# Patient Record
Sex: FEMALE | Race: BLACK OR AFRICAN AMERICAN | NOT HISPANIC OR LATINO
[De-identification: names, ages, dates, MRNs, and addresses within clinical notes are randomized per-mention and may not be internally consistent; named-entity substitution may affect disease eponyms.]

---

## 2017-10-26 ENCOUNTER — APPOINTMENT (OUTPATIENT)
Dept: INTERNAL MEDICINE | Facility: CLINIC | Age: 27
End: 2017-10-26
Payer: COMMERCIAL

## 2017-10-26 VITALS — SYSTOLIC BLOOD PRESSURE: 100 MMHG | DIASTOLIC BLOOD PRESSURE: 70 MMHG

## 2017-10-26 VITALS — WEIGHT: 188 LBS | BODY MASS INDEX: 36.91 KG/M2 | HEIGHT: 60 IN

## 2017-10-26 DIAGNOSIS — Z00.00 ENCOUNTER FOR GENERAL ADULT MEDICAL EXAMINATION W/OUT ABNORMAL FINDINGS: ICD-10-CM

## 2017-10-26 DIAGNOSIS — E55.9 VITAMIN D DEFICIENCY, UNSPECIFIED: ICD-10-CM

## 2017-10-26 DIAGNOSIS — R73.09 OTHER ABNORMAL GLUCOSE: ICD-10-CM

## 2017-10-26 DIAGNOSIS — E66.9 OBESITY, UNSPECIFIED: ICD-10-CM

## 2017-10-26 DIAGNOSIS — Z23 ENCOUNTER FOR IMMUNIZATION: ICD-10-CM

## 2017-10-26 DIAGNOSIS — Z92.29 PERSONAL HISTORY OF OTHER DRUG THERAPY: ICD-10-CM

## 2017-10-26 PROCEDURE — 99395 PREV VISIT EST AGE 18-39: CPT | Mod: 25

## 2017-10-26 PROCEDURE — 36415 COLL VENOUS BLD VENIPUNCTURE: CPT

## 2017-11-03 LAB
25(OH)D3 SERPL-MCNC: 27.4 NG/ML
ALBUMIN SERPL ELPH-MCNC: 3.8 G/DL
ALP BLD-CCNC: 54 U/L
ALT SERPL-CCNC: 12 U/L
ANION GAP SERPL CALC-SCNC: 13 MMOL/L
AST SERPL-CCNC: 17 U/L
BASOPHILS # BLD AUTO: 0.02 K/UL
BASOPHILS NFR BLD AUTO: 0.3 %
BILIRUB SERPL-MCNC: 0.3 MG/DL
BUN SERPL-MCNC: 16 MG/DL
CALCIUM SERPL-MCNC: 9.3 MG/DL
CHLORIDE SERPL-SCNC: 104 MMOL/L
CHOLEST SERPL-MCNC: 205 MG/DL
CHOLEST/HDLC SERPL: 3.1 RATIO
CO2 SERPL-SCNC: 23 MMOL/L
CREAT SERPL-MCNC: 0.97 MG/DL
EOSINOPHIL # BLD AUTO: 0.16 K/UL
EOSINOPHIL NFR BLD AUTO: 2.3 %
GLUCOSE SERPL-MCNC: 90 MG/DL
HBA1C MFR BLD HPLC: 5.6 %
HCT VFR BLD CALC: 40.3 %
HDLC SERPL-MCNC: 67 MG/DL
HGB BLD-MCNC: 12.6 G/DL
IMM GRANULOCYTES NFR BLD AUTO: 0 %
LDLC SERPL CALC-MCNC: 123 MG/DL
LYMPHOCYTES # BLD AUTO: 2.87 K/UL
LYMPHOCYTES NFR BLD AUTO: 41.4 %
MAN DIFF?: NORMAL
MCHC RBC-ENTMCNC: 29.4 PG
MCHC RBC-ENTMCNC: 31.3 GM/DL
MCV RBC AUTO: 94.2 FL
MONOCYTES # BLD AUTO: 0.39 K/UL
MONOCYTES NFR BLD AUTO: 5.6 %
NEUTROPHILS # BLD AUTO: 3.49 K/UL
NEUTROPHILS NFR BLD AUTO: 50.4 %
PLATELET # BLD AUTO: 323 K/UL
POTASSIUM SERPL-SCNC: 4.4 MMOL/L
PROT SERPL-MCNC: 7.4 G/DL
RBC # BLD: 4.28 M/UL
RBC # FLD: 14 %
SODIUM SERPL-SCNC: 140 MMOL/L
TRIGL SERPL-MCNC: 77 MG/DL
TSH SERPL-ACNC: 2.51 UIU/ML
WBC # FLD AUTO: 6.93 K/UL

## 2019-04-09 ENCOUNTER — EMERGENCY (EMERGENCY)
Facility: HOSPITAL | Age: 29
LOS: 1 days | Discharge: HOME | End: 2019-04-09
Attending: EMERGENCY MEDICINE
Payer: COMMERCIAL

## 2019-04-09 VITALS
OXYGEN SATURATION: 98 % | WEIGHT: 199.96 LBS | HEIGHT: 60 IN | HEART RATE: 120 BPM | TEMPERATURE: 100 F | DIASTOLIC BLOOD PRESSURE: 72 MMHG | SYSTOLIC BLOOD PRESSURE: 102 MMHG | RESPIRATION RATE: 20 BRPM

## 2019-04-09 PROCEDURE — 99283 EMERGENCY DEPT VISIT LOW MDM: CPT | Mod: 25

## 2019-04-10 VITALS
RESPIRATION RATE: 18 BRPM | OXYGEN SATURATION: 98 % | TEMPERATURE: 99 F | DIASTOLIC BLOOD PRESSURE: 78 MMHG | HEART RATE: 93 BPM | SYSTOLIC BLOOD PRESSURE: 119 MMHG

## 2019-04-10 PROCEDURE — 94640 AIRWAY INHALATION TREATMENT: CPT

## 2019-04-10 PROCEDURE — 99283 EMERGENCY DEPT VISIT LOW MDM: CPT

## 2019-04-10 RX ORDER — IBUPROFEN 200 MG
600 TABLET ORAL ONCE
Qty: 0 | Refills: 0 | Status: COMPLETED | OUTPATIENT
Start: 2019-04-10 | End: 2019-04-10

## 2019-04-10 RX ORDER — ALBUTEROL 90 UG/1
2 AEROSOL, METERED ORAL EVERY 6 HOURS
Qty: 0 | Refills: 0 | Status: DISCONTINUED | OUTPATIENT
Start: 2019-04-10 | End: 2019-04-14

## 2019-04-10 RX ADMIN — Medication 600 MILLIGRAM(S): at 00:34

## 2019-04-10 RX ADMIN — ALBUTEROL 2 PUFF(S): 90 AEROSOL, METERED ORAL at 00:28

## 2019-04-10 NOTE — ED PROVIDER NOTE - OBJECTIVE STATEMENT
27 y/o F pt with no PMHx presents to the ED c/o sudden onset fever beginning this morning.  Pt had a fever 100.8F (oral) this morning, and notes associated body aches, mild cough, nasal congestion, chills, and fatigue.  Pt has taken Tylenol and Mucinex for her symptoms with little to no relief.  Denies CP, SOB, N/V, dysuria, hematuria.

## 2019-04-10 NOTE — ED ADULT NURSE NOTE - OBJECTIVE STATEMENT
Pt with sudden onset fever and body aches yesterday with associated cough, chills, nausea. Pt denies any other ailments and as per FAUZIA Chris, pt to be dc'd after meds.

## 2019-04-10 NOTE — ED PROVIDER NOTE - ATTENDING CONTRIBUTION TO CARE
I, Chirag Medina, performed the initial face to face bedside interview with this patient regarding history of present illness, review of symptoms and relevant past medical, social and family history.  I completed an independent physical examination.  I was the initial provider who evaluated this patient.  The history, relevant review of systems, past medical and surgical history, medical decision making, and physical examination was documented by the scribe in my presence and I attest to the accuracy of the documentation.  I have signed out the follow up of any pending tests (i.e. labs, radiological studies) to the ACP.  I have communicated the patient’s plan of care and disposition with the ACP.

## 2019-04-10 NOTE — ED PROVIDER NOTE - NS ED ROS FT
ROS: CONTUSIONAL: Denies wt loss. HEAD: Denies trauma, HA, Dizziness. EYE: Denies Acute visual changes, diplopia. ENMT: Denies change in hearing, tinnitus, epistaxis, difficulty swallowing, sore throat. CARDIO: Denies CP, palpitations, edema. RESP: Denies SOB , Diff breathing, hemoptysis. GI: Denies N/V, ABD pain, change in bowel movement. URINARY: Denies difficulty urinating, pelvic pain. MS:  Denies back pain, weakness, decreased ROM, swelling. NEURO: Denies change in gait, seizures, loss of sensation, dizziness, confusion LOC.  PSY: NO SI/HI.

## 2019-04-10 NOTE — ED PROVIDER NOTE - CLINICAL SUMMARY MEDICAL DECISION MAKING FREE TEXT BOX
Pt with stable VS, tolerating PO in the ed making urine non toxic appearing interacting with staff and family appropriately, PT will be dc home with follow up to PCP, good supportive care, educated mom about proper use of antipyretics, good hydrations, educated about when to return to the ED if needed. PT verbalizes that he understands all instructions and results. Pt educated about the risks vs benefits of imaging at this time and agrees that not warranted for their symptoms, and PE

## 2019-04-10 NOTE — ED ADULT NURSE NOTE - NSIMPLEMENTINTERV_GEN_ALL_ED
Implemented All Universal Safety Interventions:  [place] to call system. Call bell, personal items and telephone within reach. Instruct patient to call for assistance. Room bathroom lighting operational. Non-slip footwear when patient is off stretcher. Physically safe environment: no spills, clutter or unnecessary equipment. Stretcher in lowest position, wheels locked, appropriate side rails in place.

## 2019-04-10 NOTE — ED PROVIDER NOTE - CONSTITUTIONAL, MLM
normal... Awake, non-toxic, and in no apparent distress. Awake, non-toxic, and in no apparent distress. diaphoretic

## 2019-12-09 ENCOUNTER — EMERGENCY (EMERGENCY)
Facility: HOSPITAL | Age: 29
LOS: 1 days | Discharge: HOME | End: 2019-12-09
Attending: EMERGENCY MEDICINE
Payer: COMMERCIAL

## 2019-12-09 VITALS
DIASTOLIC BLOOD PRESSURE: 69 MMHG | HEART RATE: 88 BPM | WEIGHT: 190.04 LBS | TEMPERATURE: 98 F | RESPIRATION RATE: 18 BRPM | HEIGHT: 60 IN | SYSTOLIC BLOOD PRESSURE: 114 MMHG | OXYGEN SATURATION: 99 %

## 2019-12-09 PROBLEM — Z78.9 OTHER SPECIFIED HEALTH STATUS: Chronic | Status: ACTIVE | Noted: 2019-04-10

## 2019-12-09 LAB
ALBUMIN SERPL ELPH-MCNC: 3.8 G/DL — SIGNIFICANT CHANGE UP (ref 3.3–5.2)
ALP SERPL-CCNC: 59 U/L — SIGNIFICANT CHANGE UP (ref 40–120)
ALT FLD-CCNC: 12 U/L — SIGNIFICANT CHANGE UP
ANION GAP SERPL CALC-SCNC: 11 MMOL/L — SIGNIFICANT CHANGE UP (ref 5–17)
APPEARANCE UR: CLEAR — SIGNIFICANT CHANGE UP
AST SERPL-CCNC: 18 U/L — SIGNIFICANT CHANGE UP
BACTERIA # UR AUTO: NEGATIVE — SIGNIFICANT CHANGE UP
BASOPHILS # BLD AUTO: 0.04 K/UL — SIGNIFICANT CHANGE UP (ref 0–0.2)
BASOPHILS NFR BLD AUTO: 0.7 % — SIGNIFICANT CHANGE UP (ref 0–2)
BILIRUB SERPL-MCNC: 0.4 MG/DL — SIGNIFICANT CHANGE UP (ref 0.4–2)
BILIRUB UR-MCNC: NEGATIVE — SIGNIFICANT CHANGE UP
BUN SERPL-MCNC: 10 MG/DL — SIGNIFICANT CHANGE UP (ref 8–20)
CALCIUM SERPL-MCNC: 8.7 MG/DL — SIGNIFICANT CHANGE UP (ref 8.6–10.2)
CHLORIDE SERPL-SCNC: 101 MMOL/L — SIGNIFICANT CHANGE UP (ref 98–107)
CO2 SERPL-SCNC: 24 MMOL/L — SIGNIFICANT CHANGE UP (ref 22–29)
COLOR SPEC: YELLOW — SIGNIFICANT CHANGE UP
CREAT SERPL-MCNC: 0.71 MG/DL — SIGNIFICANT CHANGE UP (ref 0.5–1.3)
DIFF PNL FLD: ABNORMAL
EOSINOPHIL # BLD AUTO: 0.12 K/UL — SIGNIFICANT CHANGE UP (ref 0–0.5)
EOSINOPHIL NFR BLD AUTO: 2 % — SIGNIFICANT CHANGE UP (ref 0–6)
EPI CELLS # UR: SIGNIFICANT CHANGE UP
GLUCOSE SERPL-MCNC: 90 MG/DL — SIGNIFICANT CHANGE UP (ref 70–115)
GLUCOSE UR QL: NEGATIVE MG/DL — SIGNIFICANT CHANGE UP
HCG SERPL-ACNC: <4 MIU/ML — SIGNIFICANT CHANGE UP
HCG UR QL: NEGATIVE — SIGNIFICANT CHANGE UP
HCT VFR BLD CALC: 38.9 % — SIGNIFICANT CHANGE UP (ref 34.5–45)
HGB BLD-MCNC: 12.7 G/DL — SIGNIFICANT CHANGE UP (ref 11.5–15.5)
IMM GRANULOCYTES NFR BLD AUTO: 0.2 % — SIGNIFICANT CHANGE UP (ref 0–1.5)
KETONES UR-MCNC: NEGATIVE — SIGNIFICANT CHANGE UP
LACTATE BLDV-MCNC: 0.5 MMOL/L — SIGNIFICANT CHANGE UP (ref 0.5–2)
LEUKOCYTE ESTERASE UR-ACNC: ABNORMAL
LIDOCAIN IGE QN: 19 U/L — LOW (ref 22–51)
LYMPHOCYTES # BLD AUTO: 1.83 K/UL — SIGNIFICANT CHANGE UP (ref 1–3.3)
LYMPHOCYTES # BLD AUTO: 30.1 % — SIGNIFICANT CHANGE UP (ref 13–44)
MCHC RBC-ENTMCNC: 30.1 PG — SIGNIFICANT CHANGE UP (ref 27–34)
MCHC RBC-ENTMCNC: 32.6 GM/DL — SIGNIFICANT CHANGE UP (ref 32–36)
MCV RBC AUTO: 92.2 FL — SIGNIFICANT CHANGE UP (ref 80–100)
MONOCYTES # BLD AUTO: 0.5 K/UL — SIGNIFICANT CHANGE UP (ref 0–0.9)
MONOCYTES NFR BLD AUTO: 8.2 % — SIGNIFICANT CHANGE UP (ref 2–14)
NEUTROPHILS # BLD AUTO: 3.57 K/UL — SIGNIFICANT CHANGE UP (ref 1.8–7.4)
NEUTROPHILS NFR BLD AUTO: 58.8 % — SIGNIFICANT CHANGE UP (ref 43–77)
NITRITE UR-MCNC: NEGATIVE — SIGNIFICANT CHANGE UP
OB PNL STL: NEGATIVE — SIGNIFICANT CHANGE UP
PH UR: 7 — SIGNIFICANT CHANGE UP (ref 5–8)
PLATELET # BLD AUTO: 282 K/UL — SIGNIFICANT CHANGE UP (ref 150–400)
POTASSIUM SERPL-MCNC: 4.3 MMOL/L — SIGNIFICANT CHANGE UP (ref 3.5–5.3)
POTASSIUM SERPL-SCNC: 4.3 MMOL/L — SIGNIFICANT CHANGE UP (ref 3.5–5.3)
PROT SERPL-MCNC: 7.5 G/DL — SIGNIFICANT CHANGE UP (ref 6.6–8.7)
PROT UR-MCNC: 15 MG/DL
RBC # BLD: 4.22 M/UL — SIGNIFICANT CHANGE UP (ref 3.8–5.2)
RBC # FLD: 12.4 % — SIGNIFICANT CHANGE UP (ref 10.3–14.5)
RBC CASTS # UR COMP ASSIST: SIGNIFICANT CHANGE UP /HPF (ref 0–4)
SODIUM SERPL-SCNC: 136 MMOL/L — SIGNIFICANT CHANGE UP (ref 135–145)
SP GR SPEC: 1.01 — SIGNIFICANT CHANGE UP (ref 1.01–1.02)
UROBILINOGEN FLD QL: NEGATIVE MG/DL — SIGNIFICANT CHANGE UP
WBC # BLD: 6.07 K/UL — SIGNIFICANT CHANGE UP (ref 3.8–10.5)
WBC # FLD AUTO: 6.07 K/UL — SIGNIFICANT CHANGE UP (ref 3.8–10.5)
WBC UR QL: SIGNIFICANT CHANGE UP

## 2019-12-09 PROCEDURE — 99284 EMERGENCY DEPT VISIT MOD MDM: CPT

## 2019-12-09 PROCEDURE — 84702 CHORIONIC GONADOTROPIN TEST: CPT

## 2019-12-09 PROCEDURE — 83690 ASSAY OF LIPASE: CPT

## 2019-12-09 PROCEDURE — 36415 COLL VENOUS BLD VENIPUNCTURE: CPT

## 2019-12-09 PROCEDURE — 74177 CT ABD & PELVIS W/CONTRAST: CPT | Mod: 26

## 2019-12-09 PROCEDURE — 74177 CT ABD & PELVIS W/CONTRAST: CPT

## 2019-12-09 PROCEDURE — 81001 URINALYSIS AUTO W/SCOPE: CPT

## 2019-12-09 PROCEDURE — 80053 COMPREHEN METABOLIC PANEL: CPT

## 2019-12-09 PROCEDURE — 85027 COMPLETE CBC AUTOMATED: CPT

## 2019-12-09 PROCEDURE — 87086 URINE CULTURE/COLONY COUNT: CPT

## 2019-12-09 PROCEDURE — 82272 OCCULT BLD FECES 1-3 TESTS: CPT

## 2019-12-09 PROCEDURE — 81025 URINE PREGNANCY TEST: CPT

## 2019-12-09 PROCEDURE — 83605 ASSAY OF LACTIC ACID: CPT

## 2019-12-09 RX ORDER — SODIUM CHLORIDE 9 MG/ML
1000 INJECTION INTRAMUSCULAR; INTRAVENOUS; SUBCUTANEOUS ONCE
Refills: 0 | Status: COMPLETED | OUTPATIENT
Start: 2019-12-09 | End: 2019-12-09

## 2019-12-09 RX ADMIN — Medication 1 TABLET(S): at 15:28

## 2019-12-09 RX ADMIN — SODIUM CHLORIDE 1000 MILLILITER(S): 9 INJECTION INTRAMUSCULAR; INTRAVENOUS; SUBCUTANEOUS at 10:40

## 2019-12-09 NOTE — ED STATDOCS - CARE PROVIDER_API CALL
Kendrick Copeland)  Gastroenterology; Internal Medicine  [address] [place] [address]  Phone: [phone]  Fax: [phone]  Follow Up Time: 7-10 Days

## 2019-12-09 NOTE — ED STATDOCS - ATTENDING CONTRIBUTION TO CARE
I, Elisabeth Valencia, performed a face to face bedside interview with this patient regarding history of present illness, review of symptoms and relevant past medical, social and family history.  I completed an independent physical examination. Medical decision making, follow-up on ordered tests (ie labs, radiologic studies) and re-evaluation of the patient's status has been communicated to the ACP.  Disposition of the patient will be based on test outcome and response to ED interventions.

## 2019-12-09 NOTE — ED STATDOCS - CARE PROVIDERS DIRECT ADDRESSES
[email or web address], [email or web address]

## 2019-12-09 NOTE — ED STATDOCS - PATIENT PORTAL LINK FT
You can access the FollowMyHealth Patient Portal offered by [facility] by registering at the following website: [email or web address]. By joining [employer]’s FollowMyHealth portal, you will also be able to view your health information using other applications (apps) compatible with our system.

## 2019-12-09 NOTE — ED STATDOCS - GENITOURINARY [-], MLM
no hematuria/no difficulty urinating/no dysuria/no pelvic pain/no STD exposure/no dysmenorrhea no hematuria/no dysuria/no difficulty urinating/no pelvic pain/no dysmenorrhea

## 2019-12-09 NOTE — ED STATDOCS - NSFOLLOWUPINSTRUCTIONS_ED_ALL_ED_FT
Please follow up with your Primary doctor in 24-48 hr.  Seek immediate medical care for any new and/or worsening signs or symptoms.   Take medications as directed, be sure to complete entire course of antibiotics as directed   Follow up with Gastroenterologist (GI) doctor in 1 week

## 2019-12-09 NOTE — ED STATDOCS - PROGRESS NOTE DETAILS
FAUZIA Lucia NOTE: Pt evaluated at bedside. No hx of GI problems. PE: non toxic, abd soft, +mildly tender to palpation over LUQ and LLQ, no guarding or rigidity, no CVA tenderness. Pt evaluated prior by intake physician. Otherwise HPI/PE/ROS as noted above. Will follow up plan per intake physician. FAUZIA Lucia NOTE: Reviewed all results with Dr. Valencia, will Rx Augmentin, f/u GI doctor. Pt stable for d/c, reports improvement, VSS, tolerating PO, ambulatory.  Discussion includes results, plan, proper medication use/side effects, and return precautions. Pt advised to f/u with PMD 1-2 days and specialists discussed.  Pt given printed copies of lab/radiology for outpt follow up. Pt verbalized understanding/agreement of plan.

## 2019-12-09 NOTE — ED STATDOCS - OBJECTIVE STATEMENT
28 y/o F pt with no significant past medical history presents to the ED c/o abdominal pain that onset 1 week ago. Patient states that every time she eats she feels gassy and bloated. She reports being able to pass gas but states it recently has been worsening. Patient states her abdominal pain has been intermittent for about month but lately has been constant. Patient noticed a sudden onset of blood in her stool yesterday and states that it seemed as though she had her menstrual period. Reports nausea but denies vomiting and diarrhea

## 2019-12-09 NOTE — ED STATDOCS - CLINICAL SUMMARY MEDICAL DECISION MAKING FREE TEXT BOX
28 y/o F with bloating, abdominal pain and bloody stools. Concern for colitis. Will check labs, CT abdomen/pelvis, give fluids,. Tylenol and reassess.

## 2019-12-10 LAB
CULTURE RESULTS: NO GROWTH — SIGNIFICANT CHANGE UP
SPECIMEN SOURCE: SIGNIFICANT CHANGE UP

## 2020-04-14 ENCOUNTER — APPOINTMENT (OUTPATIENT)
Dept: OBGYN | Facility: CLINIC | Age: 30
End: 2020-04-14
Payer: COMMERCIAL

## 2020-04-14 VITALS
HEIGHT: 60 IN | BODY MASS INDEX: 37.5 KG/M2 | DIASTOLIC BLOOD PRESSURE: 70 MMHG | WEIGHT: 191 LBS | SYSTOLIC BLOOD PRESSURE: 110 MMHG

## 2020-04-14 DIAGNOSIS — Z11.3 ENCOUNTER FOR SCREENING FOR INFECTIONS WITH A PREDOMINANTLY SEXUAL MODE OF TRANSMISSION: ICD-10-CM

## 2020-04-14 PROCEDURE — 99203 OFFICE O/P NEW LOW 30 MIN: CPT

## 2020-04-15 LAB
C TRACH RRNA SPEC QL NAA+PROBE: NOT DETECTED
HBV SURFACE AG SER QL: NONREACTIVE
HCV AB SER QL: NONREACTIVE
HCV S/CO RATIO: 0.12 S/CO
HIV1+2 AB SPEC QL IA.RAPID: NONREACTIVE
N GONORRHOEA RRNA SPEC QL NAA+PROBE: NOT DETECTED
SOURCE AMPLIFICATION: NORMAL

## 2020-04-16 LAB
HSV 1+2 IGG SER IA-IMP: NEGATIVE
HSV 1+2 IGG SER IA-IMP: POSITIVE
HSV1 IGG SER QL: 44.6 INDEX
HSV2 IGG SER QL: 0.14 INDEX
RPR SER-TITR: NORMAL

## 2020-04-19 LAB
HSV1 IGM SER QL: NORMAL TITER
HSV2 AB FLD-ACNC: NORMAL TITER

## 2020-04-22 DIAGNOSIS — Z86.19 PERSONAL HISTORY OF OTHER INFECTIOUS AND PARASITIC DISEASES: ICD-10-CM

## 2020-04-22 RX ORDER — FLUCONAZOLE 150 MG/1
150 TABLET ORAL
Qty: 2 | Refills: 0 | Status: ACTIVE | COMMUNITY
Start: 2020-04-22 | End: 1900-01-01

## 2020-04-23 LAB — BACTERIA GENITAL AEROBE CULT: ABNORMAL

## 2020-04-23 RX ORDER — FLUCONAZOLE 150 MG/1
150 TABLET ORAL
Qty: 2 | Refills: 0 | Status: ACTIVE | COMMUNITY
Start: 2020-04-23 | End: 1900-01-01

## 2020-05-10 DIAGNOSIS — Z86.19 PERSONAL HISTORY OF OTHER INFECTIOUS AND PARASITIC DISEASES: ICD-10-CM

## 2020-05-11 RX ORDER — TERCONAZOLE 4 MG/G
0.4 CREAM VAGINAL DAILY
Qty: 1 | Refills: 0 | Status: ACTIVE | COMMUNITY
Start: 2020-05-11 | End: 1900-01-01

## 2020-05-11 RX ORDER — FLUCONAZOLE 150 MG/1
150 TABLET ORAL
Qty: 2 | Refills: 0 | Status: ACTIVE | COMMUNITY
Start: 2020-05-11 | End: 1900-01-01

## 2020-07-29 ENCOUNTER — APPOINTMENT (OUTPATIENT)
Dept: OBGYN | Facility: CLINIC | Age: 30
End: 2020-07-29
Payer: MEDICAID

## 2020-07-29 VITALS
BODY MASS INDEX: 37.3 KG/M2 | WEIGHT: 190 LBS | HEIGHT: 60 IN | DIASTOLIC BLOOD PRESSURE: 70 MMHG | SYSTOLIC BLOOD PRESSURE: 120 MMHG

## 2020-07-29 DIAGNOSIS — R87.612 LOW GRADE SQUAMOUS INTRAEPITHELIAL LESION ON CYTOLOGIC SMEAR OF CERVIX (LGSIL): ICD-10-CM

## 2020-07-29 PROCEDURE — 81025 URINE PREGNANCY TEST: CPT

## 2020-07-29 PROCEDURE — 57454 BX/CURETT OF CERVIX W/SCOPE: CPT

## 2020-07-29 NOTE — ASSESSMENT
[FreeTextEntry1] : Nothing in the vagina for 1 week.  Call parameters reviewed.  Will call with results.

## 2020-07-29 NOTE — PROCEDURE
[Colposcopy] : colposcopy [LGSIL] : low grade squamous intraepithelial lesion [Patient] : patient [Risks] : risks [Benefits] : benefits [Alternatives] : alternatives [Infection] : infection [Bleeding] : bleeding [Consent Obtained] : written consent was obtained prior to the procedure [Allergic Reaction] : allergic reaction [SCJ Fully Visulized] : the squamocolumnar junction was fully visualized [No Abnormalities] : no abnormalities [Punctation ___ o'clock] : no punctation [Acetowhite ___ o'clock] : ascetowhite changes at [unfilled] ~Uo'clock [Mosaicism ___ o'clock] : no mosaicism [Atypical Vessels ___ o'clock] : no atypical vessels [Biopsies Taken: # ___] : [unfilled] biopsies taken of the cervix [Cherelle's] : Cherelle's solution [Biopsy Locations ___ o'clock] : the biopsies were taken at [unfilled] o'clock [ECC Done] : Endocervical curettage was performed.  [Tolerated Well] : the patient tolerated the procedure well [No Complications] : there were no complications

## 2020-08-06 LAB — CORE LAB BIOPSY: NORMAL

## 2020-08-29 RX ORDER — METRONIDAZOLE 7.5 MG/G
0.75 GEL VAGINAL
Qty: 1 | Refills: 0 | Status: ACTIVE | COMMUNITY
Start: 2020-08-29 | End: 1900-01-01

## 2020-12-23 PROBLEM — Z86.19 HISTORY OF CANDIDIASIS OF VAGINA: Status: RESOLVED | Noted: 2020-04-22 | Resolved: 2020-12-23

## 2021-01-13 ENCOUNTER — EMERGENCY (EMERGENCY)
Facility: HOSPITAL | Age: 31
LOS: 1 days | Discharge: HOME | End: 2021-01-13
Payer: MEDICAID

## 2021-01-13 VITALS
SYSTOLIC BLOOD PRESSURE: 108 MMHG | RESPIRATION RATE: 16 BRPM | TEMPERATURE: 99 F | HEIGHT: 60 IN | DIASTOLIC BLOOD PRESSURE: 60 MMHG | HEART RATE: 84 BPM | OXYGEN SATURATION: 100 %

## 2021-01-13 LAB — SARS-COV-2 RNA SPEC QL NAA+PROBE: DETECTED

## 2021-01-13 PROCEDURE — 99283 EMERGENCY DEPT VISIT LOW MDM: CPT

## 2021-01-13 PROCEDURE — U0003: CPT

## 2021-01-13 PROCEDURE — U0005: CPT

## 2021-01-13 NOTE — ED PROVIDER NOTE - OBJECTIVE STATEMENT
COVID ASYMPTOMATIC SWAB  Pt presenting to the ER for COVID-19 testing. Denies fevers chills, loss of taste or smell, URI symptoms, chest pain or shortness of breath, nausea vomiting diarrhea abdominal pain, weakness or fatigue. Eating and drinking normal diet. Normal output. Pt requesting testing at this time. was positive 2 weeks ago, needs repeat testing

## 2021-01-13 NOTE — ED PROVIDER NOTE - PATIENT PORTAL LINK FT
You can access the FollowMyHealth Patient Portal offered by [facility] by registering at the following website: [email or web address]. By joining [employer]’s FollowMyHealth portal, you will also be able to view your health information using other applications (apps) compatible with our system.

## 2021-01-13 NOTE — ED PROVIDER NOTE - NSFOLLOWUPINSTRUCTIONS_ED_ALL_ED_FT
Patient was given preprinted [facility] Exitcare instructions for URI and Covid information.    Verbal instructions were given to patient and answered all questions. patient verbalizes understanding

## 2021-01-25 ENCOUNTER — EMERGENCY (EMERGENCY)
Facility: HOSPITAL | Age: 31
LOS: 1 days | Discharge: HOME | End: 2021-01-25
Payer: MEDICAID

## 2021-01-25 VITALS
OXYGEN SATURATION: 100 % | RESPIRATION RATE: 18 BRPM | HEIGHT: 60 IN | HEART RATE: 80 BPM | SYSTOLIC BLOOD PRESSURE: 119 MMHG | TEMPERATURE: 98 F | DIASTOLIC BLOOD PRESSURE: 78 MMHG

## 2021-01-25 LAB — SARS-COV-2 RNA SPEC QL NAA+PROBE: DETECTED

## 2021-01-25 PROCEDURE — 99283 EMERGENCY DEPT VISIT LOW MDM: CPT

## 2021-01-25 PROCEDURE — U0005: CPT

## 2021-01-25 PROCEDURE — U0003: CPT

## 2021-01-25 PROCEDURE — 99282 EMERGENCY DEPT VISIT SF MDM: CPT

## 2021-01-25 NOTE — ED PROVIDER NOTE - NS ED ROS FT
Gen: denies fever, chills, fatigue, weight loss  Skin: denies rashes, laceration, bruising  HEENT: denies visual changes, ear pain, nasal congestion, throat pain  Respiratory: denies MEDEIROS, SOB, cough, wheezing  Cardiovascular: denies chest pain, palpitations, diaphoresis, LE edema  GI: denies abdominal pain, n/v/d

## 2021-01-25 NOTE — ED PROVIDER NOTE - PATIENT PORTAL LINK FT
You can access the FollowMyHealth Patient Portal offered by [facility] by registering at the following website: [email or web address]. By joining [employer]’s FollowMyHealth portal, you will also be able to view your health information using other applications (apps) compatible with our system.

## 2021-01-25 NOTE — ED PROVIDER NOTE - OBJECTIVE STATEMENT
30y F presenting for covid testing. Tested positive on 12/31, re-tested on 1/13 was still positive. Needs negative to return to work. Denies fever, chills, cough, sob, cp, body aches, loss of smell/taste.

## 2021-06-01 NOTE — ED PROVIDER NOTE - CONDITION AT DISCHARGE:
6/1/2021  Patient seeing with BABITA MASSEY for a office follow-up visit. Questions and discussion completed to the satisfaction of the patient. STOPPING treatment by patient choice. STAT CT today per patient request. F/U OV with Dr Domenico Farris scheduled. NP to send oxycodone prescription. Magnesium checked today. Patient wishes to follow with this plan and discuss with Dr Domenico Farris.  Navigation will follow
Improved

## 2021-12-20 DIAGNOSIS — N76.0 ACUTE VAGINITIS: ICD-10-CM

## 2021-12-20 DIAGNOSIS — B96.89 ACUTE VAGINITIS: ICD-10-CM

## 2021-12-20 RX ORDER — METRONIDAZOLE 7.5 MG/G
0.75 GEL VAGINAL
Qty: 1 | Refills: 0 | Status: ACTIVE | COMMUNITY
Start: 2021-12-20 | End: 1900-01-01

## 2022-04-12 ENCOUNTER — APPOINTMENT (OUTPATIENT)
Dept: OBGYN | Facility: CLINIC | Age: 32
End: 2022-04-12
Payer: SELF-PAY

## 2022-04-12 VITALS
BODY MASS INDEX: 37.3 KG/M2 | SYSTOLIC BLOOD PRESSURE: 114 MMHG | HEIGHT: 60 IN | WEIGHT: 190 LBS | DIASTOLIC BLOOD PRESSURE: 74 MMHG

## 2022-04-12 DIAGNOSIS — R87.610 ATYPICAL SQUAMOUS CELLS OF UNDETERMINED SIGNIFICANCE ON CYTOLOGIC SMEAR OF CERVIX (ASC-US): ICD-10-CM

## 2022-04-12 DIAGNOSIS — R87.810 ATYPICAL SQUAMOUS CELLS OF UNDETERMINED SIGNIFICANCE ON CYTOLOGIC SMEAR OF CERVIX (ASC-US): ICD-10-CM

## 2022-04-12 PROCEDURE — 57454 BX/CURETT OF CERVIX W/SCOPE: CPT

## 2022-04-12 PROCEDURE — 81025 URINE PREGNANCY TEST: CPT

## 2022-04-12 NOTE — HISTORY OF PRESENT ILLNESS
[FreeTextEntry1] : 31-year-old female presents for colposcopy.  She is getting her routine GYN care at [facility].  She typically gets her care with the PA.  She has a history of an abnormal Pap smear.  She had a Pap this year that showed atypical cells with positive HPV.  The Pap was also positive for trichomonas.  The patient was treated for trichomonas and follow-up culture was negative.  She is here to have a colposcopy today.  The physician at the other office who would do a colposcopy is a male.  She is more comfortable having a female do the procedure.  She has no complaints today.

## 2022-04-12 NOTE — REASON FOR VISIT
[Follow-Up] : a follow-up evaluation of Patient presented complaining of left-sided chest pain rating to upper extremity, occurred after taking CBD gummy earlier.  Patient labs reviewed, dimer negative, troponin less than 0.01, repeat 0.01, mag 1.7.  Patient evaluated by cardio fellow, if troponins negative may DC home and follow-up outpatient as per discussion with cardiologist, however no nearly stable still slight increase in setting of risk factors and typical pain patient advised admission after second troponin result received.  Patient refusing further work-up or admission, requested to leave AMA.    I have discussed the risks, benefits and alternatives (including the possibility of worsening of disease, pain, permanent disability, and/or death) with the patient.  The patient voiced understanding of these risks, benefits, and alternatives and still wished to sign out against medical advice.  The patient was awake, alert, oriented  x 3 and has demonstrated capacity to refuse/direct care.  I  advised the patient that they can and should return immediately should they develop any worse/different/additional symptoms, or if they change their mind and want to continue their care. Patient advised to closely follow-up with cardiologist, strict return precautions advised and patient verbalized understanding.

## 2022-04-12 NOTE — PLAN
[FreeTextEntry1] : Patient counseled on colposcopy findings.  No abnormalities were noted.  No biopsies were taken.  Reassurance was provided.  Call parameters reviewed.  Will return to her GYN for an annual exam in 1 year.\par

## 2022-04-12 NOTE — PROCEDURE
[Colposcopy] : Colposcopy  [Risks] : risks [Benefits] : benefits [Alternatives] : alternatives [Patient] : patient [Infection] : infection [Bleeding] : bleeding [Allergic Reaction] : allergic reaction [ASCUS] : ASCUS [HPV High Risk] : HPV high risk [Colposcopy Adequate] : colposcopy adequate [SCI Fully Visualized] : SCI fully visualized [No Abnormalities] : no abnormalities [Tolerated Well] : the patient tolerated the procedure well

## 2022-05-13 NOTE — ED PROVIDER NOTE - NS ED MD DISPO DISCHARGE
FUTURE VISIT INFORMATION      SURGERY INFORMATION:    Date: 22    Location: uu or    Surgeon:  Rolando Walden MD    Anesthesia Type:  general    Procedure: Laparoscopic colostomy creation possible open CREATION, COLOSTOMY    RECORDS REQUESTED FROM:        Primary Care Provider: Mamta Rothman NP  [facility]    Pertinent Medical History: mediastinal emphysema    Most recent EKG+ Tracin21      
Home

## 2022-06-20 ENCOUNTER — EMERGENCY (EMERGENCY)
Facility: HOSPITAL | Age: 32
LOS: 1 days | Discharge: HOME | End: 2022-06-20
Attending: EMERGENCY MEDICINE
Payer: MEDICAID

## 2022-06-20 VITALS
HEIGHT: 59 IN | WEIGHT: 188.05 LBS | SYSTOLIC BLOOD PRESSURE: 116 MMHG | TEMPERATURE: 98 F | RESPIRATION RATE: 16 BRPM | OXYGEN SATURATION: 100 % | HEART RATE: 98 BPM | DIASTOLIC BLOOD PRESSURE: 72 MMHG

## 2022-06-20 PROCEDURE — 99283 EMERGENCY DEPT VISIT LOW MDM: CPT

## 2022-06-20 RX ORDER — ONDANSETRON 8 MG/1
1 TABLET, FILM COATED ORAL
Qty: 20 | Refills: 0
Start: 2022-06-20 | End: 2022-06-24

## 2022-06-20 RX ORDER — FAMOTIDINE 10 MG/ML
40 INJECTION INTRAVENOUS ONCE
Refills: 0 | Status: COMPLETED | OUTPATIENT
Start: 2022-06-20 | End: 2022-06-20

## 2022-06-20 RX ORDER — ONDANSETRON 8 MG/1
4 TABLET, FILM COATED ORAL ONCE
Refills: 0 | Status: COMPLETED | OUTPATIENT
Start: 2022-06-20 | End: 2022-06-20

## 2022-06-20 RX ORDER — FAMOTIDINE 10 MG/ML
1 INJECTION INTRAVENOUS
Qty: 15 | Refills: 0
Start: 2022-06-20 | End: 2022-07-04

## 2022-06-20 RX ADMIN — FAMOTIDINE 40 MILLIGRAM(S): 10 INJECTION INTRAVENOUS at 05:14

## 2022-06-20 RX ADMIN — ONDANSETRON 4 MILLIGRAM(S): 8 TABLET, FILM COATED ORAL at 05:14

## 2022-06-20 RX ADMIN — Medication 20 MILLIGRAM(S): at 05:14

## 2022-06-20 NOTE — ED ADULT TRIAGE NOTE - CHIEF COMPLAINT QUOTE
Ambulatory reporting having a "black and blue" burger at work yesterday and now complains of N/V/D and abdominal pain since 6pm. Patient denies sick contacts, travel, fevers. Took Pepto Bismuth at approx 830p but states that she vomited it right back up.

## 2022-06-20 NOTE — ED PROVIDER NOTE - PROGRESS NOTE DETAILS
Pt improved with meds.  Tolerating po without recurrent vomiting and feels well for d/c.  Rx Pepcid, Zofran and Bentyl with GI/u as outpt

## 2022-06-20 NOTE — ED ADULT NURSE NOTE - OBJECTIVE STATEMENT
c/o abd pain and vomiting. Pt stated she began to have abd pain and episodes of vomiting after eating a hamburger with zohaib cheese. pt denies CP, SOB, dizziness, diarrhea, back pain, fevers, chills. Pt AOx4, speaking coherently, respirations even and unlabored on RA, skin warm and dry.

## 2022-06-20 NOTE — ED PROVIDER NOTE - OBJECTIVE STATEMENT
30 yo F presents to ED c/o epigastric abd pain with increased nausea and multiple episodes of vomiting and watery diarrhea after eating a hamburger with zohaib cheese.  No assoc fever or chills.  No urinary s/s.  Pt tried to take Pepto Bismol at home but vomited

## 2022-06-20 NOTE — ED PROVIDER NOTE - PATIENT PORTAL LINK FT
You can access the FollowMyHealth Patient Portal offered by [facility] by registering at the following website: [email or web address]. By joining [employer]’s FollowMyHealth portal, you will also be able to view your health information using other applications (apps) compatible with our system.

## 2022-06-20 NOTE — ED PROVIDER NOTE - NSFOLLOWUPINSTRUCTIONS_ED_ALL_ED_FT
Viral Gastroenteritis, Adult       Viral gastroenteritis is also known as the stomach flu. This condition may affect your stomach, your small intestine, and your large intestine. It can cause sudden watery poop (diarrhea), fever, and throwing up (vomiting). This condition is caused by certain germs (viruses). These germs can be passed from person to person very easily (are contagious).    Having watery poop and throwing up can make you feel weak and cause you to not have enough water in your body (get dehydrated). This can make you tired and thirsty, make you have a dry mouth, and make it so you pee (urinate) less often. It is important to replace the fluids that you lose from having watery poop and throwing up.    What are the causes?  You can get sick by catching viruses from other people.  You can also get sick by:    Eating food, drinking water, or touching a surface that has the viruses on it (is contaminated).  Sharing utensils or other personal items with a person who is sick.    What increases the risk?  Having a weak body defense system (immune system).  Living with one or more children who are younger than 2 years old.  Living in a nursing home.  Going on cruise ships.    What are the signs or symptoms?  Symptoms of this condition start suddenly. Symptoms may last for a few days or for as long as a week.    Common symptoms include:    Watery poop.  Throwing up.  Other symptoms include:    Fever.  Headache.  Feeling tired (fatigue).  Pain in the belly (abdomen).  Chills.  Feeling weak.  Feeling sick to your stomach (nauseous).  Muscle aches.  Not feeling hungry.    How is this treated?  This condition typically goes away on its own.  The focus of treatment is to replace the fluids that you lose. This condition may be treated with:    An ORS (oral rehydration solution). This is a drink that is sold at pharmacies and stores.  Medicines to help with your symptoms.  Probiotic supplements to reduce symptoms of diarrhea.  Fluids given through an IV tube, if needed.  Older adults and people with other diseases or a weak body defense system are at higher risk for not having enough water in the body.    Follow these instructions at home:      Eating and drinking     Take an ORS as told by your doctor.  Drink clear fluids in small amounts as you are able. Clear fluids include:    Water.  Ice chips.  Fruit juice with water added to it (diluted).  Low-calorie sports drinks.  Drink enough fluid to keep your pee (urine) pale yellow.  Eat small amounts of healthy foods every 3–4 hours as you are able. This may include whole grains, fruits, vegetables, lean meats, and yogurt.  Avoid fluids that have a lot of sugar or caffeine in them, such as energy drinks, sports drinks, and soda.  Avoid spicy or fatty foods.  Avoid alcohol.        General instructions     Wash your hands often. This is very important after you have watery poop or you throw up. If you cannot use soap and water, use hand .  Make sure that all people in your home wash their hands well and often.  Take over-the-counter and prescription medicines only as told by your doctor.  Rest at home while you get better.  Watch your condition for any changes.  Take a warm bath to help with any burning or pain from having watery poop.  Keep all follow-up visits as told by your doctor. This is important.    Contact a doctor if:  You cannot keep fluids down.  Your symptoms get worse.  You have new symptoms.  You feel light-headed.  You feel dizzy.  You have muscle cramps.    Get help right away if:  You have chest pain.  You feel very weak.  You pass out (faint).  You see blood in your throw-up.  Your throw-up looks like coffee grounds.  You have bloody or black poop (stools) or poop that looks like tar.  You have a very bad headache, or a stiff neck, or both.  You have a rash.  You have very bad pain, cramping, or bloating in your belly.  You have trouble breathing.  You are breathing very quickly.  You have a fast heartbeat.  Your skin feels cold and clammy.  You feel mixed up (confused).  You have pain when you pee.  You have signs of not having enough water in the body, such as:    Dark pee, hardly any pee, or no pee.  Cracked lips.  Dry mouth.  Sunken eyes.  Feeling very sleepy.  Feeling weak.    Summary  Viral gastroenteritis is also known as the stomach flu.  This condition can cause sudden watery poop (diarrhea), fever, and throwing up (vomiting).  These germs can be passed from person to person very easily.  Take an ORS as told by your doctor. This is a drink that is sold at pharmacies and stores.  Drink fluids in small amounts many times each day as you are able.    ADDITIONAL NOTES AND INSTRUCTIONS    Please follow up with your Primary MD in 24-48 hr.  Seek immediate medical care for any new/worsening signs or symptoms. Pepcid 40 mg once daily  Zofran ODT 4 mg every 6 hrs as needed for nausea  Bentyl 10 mg every 6 hrs as needed for abdominal cramping  Follow up next 2-3 days  Return sooner for any problems         Viral Gastroenteritis, Adult       Viral gastroenteritis is also known as the stomach flu. This condition may affect your stomach, your small intestine, and your large intestine. It can cause sudden watery poop (diarrhea), fever, and throwing up (vomiting). This condition is caused by certain germs (viruses). These germs can be passed from person to person very easily (are contagious).    Having watery poop and throwing up can make you feel weak and cause you to not have enough water in your body (get dehydrated). This can make you tired and thirsty, make you have a dry mouth, and make it so you pee (urinate) less often. It is important to replace the fluids that you lose from having watery poop and throwing up.    What are the causes?  You can get sick by catching viruses from other people.  You can also get sick by:    Eating food, drinking water, or touching a surface that has the viruses on it (is contaminated).  Sharing utensils or other personal items with a person who is sick.    What increases the risk?  Having a weak body defense system (immune system).  Living with one or more children who are younger than 2 years old.  Living in a nursing home.  Going on cruise ships.    What are the signs or symptoms?  Symptoms of this condition start suddenly. Symptoms may last for a few days or for as long as a week.    Common symptoms include:    Watery poop.  Throwing up.  Other symptoms include:    Fever.  Headache.  Feeling tired (fatigue).  Pain in the belly (abdomen).  Chills.  Feeling weak.  Feeling sick to your stomach (nauseous).  Muscle aches.  Not feeling hungry.    How is this treated?  This condition typically goes away on its own.  The focus of treatment is to replace the fluids that you lose. This condition may be treated with:    An ORS (oral rehydration solution). This is a drink that is sold at pharmacies and stores.  Medicines to help with your symptoms.  Probiotic supplements to reduce symptoms of diarrhea.  Fluids given through an IV tube, if needed.  Older adults and people with other diseases or a weak body defense system are at higher risk for not having enough water in the body.    Follow these instructions at home:      Eating and drinking     Take an ORS as told by your doctor.  Drink clear fluids in small amounts as you are able. Clear fluids include:    Water.  Ice chips.  Fruit juice with water added to it (diluted).  Low-calorie sports drinks.  Drink enough fluid to keep your pee (urine) pale yellow.  Eat small amounts of healthy foods every 3–4 hours as you are able. This may include whole grains, fruits, vegetables, lean meats, and yogurt.  Avoid fluids that have a lot of sugar or caffeine in them, such as energy drinks, sports drinks, and soda.  Avoid spicy or fatty foods.  Avoid alcohol.        General instructions     Wash your hands often. This is very important after you have watery poop or you throw up. If you cannot use soap and water, use hand .  Make sure that all people in your home wash their hands well and often.  Take over-the-counter and prescription medicines only as told by your doctor.  Rest at home while you get better.  Watch your condition for any changes.  Take a warm bath to help with any burning or pain from having watery poop.  Keep all follow-up visits as told by your doctor. This is important.    Contact a doctor if:  You cannot keep fluids down.  Your symptoms get worse.  You have new symptoms.  You feel light-headed.  You feel dizzy.  You have muscle cramps.    Get help right away if:  You have chest pain.  You feel very weak.  You pass out (faint).  You see blood in your throw-up.  Your throw-up looks like coffee grounds.  You have bloody or black poop (stools) or poop that looks like tar.  You have a very bad headache, or a stiff neck, or both.  You have a rash.  You have very bad pain, cramping, or bloating in your belly.  You have trouble breathing.  You are breathing very quickly.  You have a fast heartbeat.  Your skin feels cold and clammy.  You feel mixed up (confused).  You have pain when you pee.  You have signs of not having enough water in the body, such as:    Dark pee, hardly any pee, or no pee.  Cracked lips.  Dry mouth.  Sunken eyes.  Feeling very sleepy.  Feeling weak.    Summary  Viral gastroenteritis is also known as the stomach flu.  This condition can cause sudden watery poop (diarrhea), fever, and throwing up (vomiting).  These germs can be passed from person to person very easily.  Take an ORS as told by your doctor. This is a drink that is sold at pharmacies and stores.  Drink fluids in small amounts many times each day as you are able.    ADDITIONAL NOTES AND INSTRUCTIONS    Please follow up with your Primary MD in 24-48 hr.  Seek immediate medical care for any new/worsening signs or symptoms.

## 2022-08-15 ENCOUNTER — EMERGENCY (EMERGENCY)
Facility: HOSPITAL | Age: 32
LOS: 1 days | Discharge: HOME | End: 2022-08-15
Attending: STUDENT IN AN ORGANIZED HEALTH CARE EDUCATION/TRAINING PROGRAM | Admitting: EMERGENCY MEDICINE

## 2022-08-15 VITALS
HEART RATE: 85 BPM | DIASTOLIC BLOOD PRESSURE: 79 MMHG | SYSTOLIC BLOOD PRESSURE: 111 MMHG | RESPIRATION RATE: 18 BRPM | TEMPERATURE: 98 F | OXYGEN SATURATION: 97 %

## 2022-08-15 LAB
ALBUMIN SERPL ELPH-MCNC: 4 G/DL — SIGNIFICANT CHANGE UP (ref 3.3–5)
ALP SERPL-CCNC: 62 U/L — SIGNIFICANT CHANGE UP (ref 40–120)
ALT FLD-CCNC: 12 U/L — SIGNIFICANT CHANGE UP (ref 4–33)
ANION GAP SERPL CALC-SCNC: 12 MMOL/L — SIGNIFICANT CHANGE UP (ref 7–14)
AST SERPL-CCNC: 17 U/L — SIGNIFICANT CHANGE UP (ref 4–32)
BASOPHILS # BLD AUTO: 0.05 K/UL — SIGNIFICANT CHANGE UP (ref 0–0.2)
BASOPHILS NFR BLD AUTO: 0.7 % — SIGNIFICANT CHANGE UP (ref 0–2)
BILIRUB SERPL-MCNC: 0.4 MG/DL — SIGNIFICANT CHANGE UP (ref 0.2–1.2)
BUN SERPL-MCNC: 14 MG/DL — SIGNIFICANT CHANGE UP (ref 7–23)
CALCIUM SERPL-MCNC: 9 MG/DL — SIGNIFICANT CHANGE UP (ref 8.4–10.5)
CHLORIDE SERPL-SCNC: 101 MMOL/L — SIGNIFICANT CHANGE UP (ref 98–107)
CO2 SERPL-SCNC: 24 MMOL/L — SIGNIFICANT CHANGE UP (ref 22–31)
CREAT SERPL-MCNC: 0.8 MG/DL — SIGNIFICANT CHANGE UP (ref 0.5–1.3)
EGFR: 100 ML/MIN/1.73M2 — SIGNIFICANT CHANGE UP
EOSINOPHIL # BLD AUTO: 0.14 K/UL — SIGNIFICANT CHANGE UP (ref 0–0.5)
EOSINOPHIL NFR BLD AUTO: 2.1 % — SIGNIFICANT CHANGE UP (ref 0–6)
GLUCOSE SERPL-MCNC: 97 MG/DL — SIGNIFICANT CHANGE UP (ref 70–99)
HCG SERPL-ACNC: <5 MIU/ML — SIGNIFICANT CHANGE UP
HCT VFR BLD CALC: 39.2 % — SIGNIFICANT CHANGE UP (ref 34.5–45)
HGB BLD-MCNC: 12.4 G/DL — SIGNIFICANT CHANGE UP (ref 11.5–15.5)
IANC: 3.88 K/UL — SIGNIFICANT CHANGE UP (ref 1.8–7.4)
IMM GRANULOCYTES NFR BLD AUTO: 0.3 % — SIGNIFICANT CHANGE UP (ref 0–1.5)
LYMPHOCYTES # BLD AUTO: 2.12 K/UL — SIGNIFICANT CHANGE UP (ref 1–3.3)
LYMPHOCYTES # BLD AUTO: 31.6 % — SIGNIFICANT CHANGE UP (ref 13–44)
MCHC RBC-ENTMCNC: 29.5 PG — SIGNIFICANT CHANGE UP (ref 27–34)
MCHC RBC-ENTMCNC: 31.6 GM/DL — LOW (ref 32–36)
MCV RBC AUTO: 93.1 FL — SIGNIFICANT CHANGE UP (ref 80–100)
MONOCYTES # BLD AUTO: 0.49 K/UL — SIGNIFICANT CHANGE UP (ref 0–0.9)
MONOCYTES NFR BLD AUTO: 7.3 % — SIGNIFICANT CHANGE UP (ref 2–14)
NEUTROPHILS # BLD AUTO: 3.88 K/UL — SIGNIFICANT CHANGE UP (ref 1.8–7.4)
NEUTROPHILS NFR BLD AUTO: 58 % — SIGNIFICANT CHANGE UP (ref 43–77)
NRBC # BLD: 0 /100 WBCS — SIGNIFICANT CHANGE UP
NRBC # FLD: 0 K/UL — SIGNIFICANT CHANGE UP
PLATELET # BLD AUTO: 320 K/UL — SIGNIFICANT CHANGE UP (ref 150–400)
POTASSIUM SERPL-MCNC: 4 MMOL/L — SIGNIFICANT CHANGE UP (ref 3.5–5.3)
POTASSIUM SERPL-SCNC: 4 MMOL/L — SIGNIFICANT CHANGE UP (ref 3.5–5.3)
PROT SERPL-MCNC: 7.3 G/DL — SIGNIFICANT CHANGE UP (ref 6–8.3)
RBC # BLD: 4.21 M/UL — SIGNIFICANT CHANGE UP (ref 3.8–5.2)
RBC # FLD: 13.7 % — SIGNIFICANT CHANGE UP (ref 10.3–14.5)
SODIUM SERPL-SCNC: 137 MMOL/L — SIGNIFICANT CHANGE UP (ref 135–145)
WBC # BLD: 6.7 K/UL — SIGNIFICANT CHANGE UP (ref 3.8–10.5)
WBC # FLD AUTO: 6.7 K/UL — SIGNIFICANT CHANGE UP (ref 3.8–10.5)

## 2022-08-15 PROCEDURE — 73564 X-RAY EXAM KNEE 4 OR MORE: CPT | Mod: 26,RT

## 2022-08-15 PROCEDURE — 72125 CT NECK SPINE W/O DYE: CPT | Mod: 26,MA

## 2022-08-15 PROCEDURE — 99285 EMERGENCY DEPT VISIT HI MDM: CPT

## 2022-08-15 PROCEDURE — 99053 MED SERV 10PM-8AM 24 HR FAC: CPT

## 2022-08-15 PROCEDURE — 70450 CT HEAD/BRAIN W/O DYE: CPT | Mod: 26,MA

## 2022-08-15 PROCEDURE — 73590 X-RAY EXAM OF LOWER LEG: CPT | Mod: 26,RT

## 2022-08-15 PROCEDURE — 71260 CT THORAX DX C+: CPT | Mod: 26,MA

## 2022-08-15 PROCEDURE — 73080 X-RAY EXAM OF ELBOW: CPT | Mod: 26,LT

## 2022-08-15 PROCEDURE — 74177 CT ABD & PELVIS W/CONTRAST: CPT | Mod: 26,MA

## 2022-08-15 RX ORDER — ACETAMINOPHEN 500 MG
975 TABLET ORAL ONCE
Refills: 0 | Status: COMPLETED | OUTPATIENT
Start: 2022-08-15 | End: 2022-08-15

## 2022-08-15 RX ORDER — IBUPROFEN 200 MG
600 TABLET ORAL ONCE
Refills: 0 | Status: COMPLETED | OUTPATIENT
Start: 2022-08-15 | End: 2022-08-15

## 2022-08-15 RX ORDER — LIDOCAINE 4 G/100G
1 CREAM TOPICAL ONCE
Refills: 0 | Status: COMPLETED | OUTPATIENT
Start: 2022-08-15 | End: 2022-08-15

## 2022-08-15 RX ADMIN — Medication 975 MILLIGRAM(S): at 08:15

## 2022-08-15 RX ADMIN — Medication 600 MILLIGRAM(S): at 08:16

## 2022-08-15 RX ADMIN — LIDOCAINE 1 PATCH: 4 CREAM TOPICAL at 08:16

## 2022-08-15 NOTE — ED PROVIDER NOTE - PHYSICAL EXAMINATION
Dayron BRADY:  VITALS: Initial triage and subsequent vitals have been reviewed by me.  GEN APPEARANCE: WDWN, alert and cooperative, non-toxic appearing and in NAD  HEAD: R facial ttp, R paracervical trap ttp   EYES: PERRLa, EOMI, vision grossly intact.   EARS: Gross hearing intact.   NOSE: No nasal discharge, no external evidence of epistaxis.   NECK: Supple  CV: RRR, S1S2, no c/r/m/g. No cyanosis. Extremities warm, well perfused. Cap refill <2 seconds. No bruits.   LUNGS: CTAB. No wheezing. No rales. No rhonchi. No diminished breath sounds.   ABDOMEN: Soft, NTND. No guarding or rebound. No masses.   MSK/EXT: Spine appears normal, no spine point tenderness. R knee ttp, L elbow ttp   NEURO: Alert, follows commands. Weight bearing normal. Speech normal. Sensation and motor normal x4 extremities.   SKIN: Normal color for race, warm, dry and intact. No evidence of rash.  PSYCH: Normal mood and affect.
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
## 2022-08-15 NOTE — ED PROVIDER NOTE - PROGRESS NOTE DETAILS
On reassessment, patient reports feeling well without focal pain at this time. Discussed imaging and lab results. Patient is fully alert and oriented and clinically sober at this time. Plan for discharge with close PCP follow up.
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
## 2022-08-15 NOTE — ED PROVIDER NOTE - ATTENDING CONTRIBUTION TO CARE
I have personally performed a face to face medical and diagnostic evaluation of the patient. I have discussed with and reviewed the Resident's and/or ACP's and/or Medical/PA/NP student's note and agree with the History, ROS, Physical Exam and MDM unless otherwise indicated. A brief summary of my personal evaluation and impression can be found below.    Dayron BRADY: Please see the HPI, ROS, PE and MDM as authored by me.
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
## 2022-08-15 NOTE — ED PROVIDER NOTE - PATIENT PORTAL LINK FT
You can access the FollowMyHealth Patient Portal offered by [facility] by registering at the following website: [email or web address]. By joining [employer]’s FollowMyHealth portal, you will also be able to view your health information using other applications (apps) compatible with our system.

## 2022-12-12 ENCOUNTER — EMERGENCY (EMERGENCY)
Facility: HOSPITAL | Age: 32
LOS: 1 days | Discharge: HOME | End: 2022-12-12
Attending: EMERGENCY MEDICINE
Payer: MEDICAID

## 2022-12-12 VITALS
HEART RATE: 122 BPM | RESPIRATION RATE: 18 BRPM | DIASTOLIC BLOOD PRESSURE: 76 MMHG | OXYGEN SATURATION: 100 % | TEMPERATURE: 98 F | WEIGHT: 192.9 LBS | SYSTOLIC BLOOD PRESSURE: 103 MMHG

## 2022-12-12 VITALS — HEART RATE: 89 BPM | OXYGEN SATURATION: 100 %

## 2022-12-12 PROBLEM — Z78.9 OTHER SPECIFIED HEALTH STATUS: Chronic | Status: ACTIVE | Noted: 2022-08-15

## 2022-12-12 PROCEDURE — 99283 EMERGENCY DEPT VISIT LOW MDM: CPT

## 2022-12-12 RX ORDER — ONDANSETRON 8 MG/1
1 TABLET, FILM COATED ORAL
Qty: 10 | Refills: 0
Start: 2022-12-12 | End: 2022-12-16

## 2022-12-12 RX ORDER — PANTOPRAZOLE SODIUM 20 MG/1
1 TABLET, DELAYED RELEASE ORAL
Qty: 20 | Refills: 0
Start: 2022-12-12 | End: 2022-12-21

## 2022-12-12 NOTE — ED PROVIDER NOTE - NS ED ATTENDING STATEMENT MOD
This was a shared visit with the AUDI. I reviewed and verified the documentation and independently performed the documented:

## 2022-12-12 NOTE — ED PROVIDER NOTE - PATIENT PORTAL LINK FT
You can access the FollowMyHealth Patient Portal offered by [facility] by registering at the following website: [email or web address]. By joining [employer]’s FollowMyHealth portal, you will also be able to view your health information using other applications (apps) compatible with our system.

## 2022-12-12 NOTE — ED ADULT TRIAGE NOTE - CHIEF COMPLAINT QUOTE
C/O diarrhea, nausea and abd cramping for the past 3 days. normal PO intake. Reports possible fever and chills.

## 2022-12-12 NOTE — ED PROVIDER NOTE - OBJECTIVE STATEMENT
32F with no pmh presenting with diarrhea x 3 days. Pt states she ate out fish 4 days ago. The next day she began having abdominal cramping and diarrhea. Some nausea after eating. No vomiting. 32F with no pmh presenting with diarrhea x 3 days. Pt states she ate fish at a restaurant 4 days ago. The next day she began having abdominal cramping and diarrhea. Some nausea after eating. No vomiting. States she last took Tylenol 3 days ago after subjective fever.

## 2022-12-12 NOTE — ED PROVIDER NOTE - CLINICAL SUMMARY MEDICAL DECISION MAKING FREE TEXT BOX
diarrhea after eating fish at a restaurant. VSS, pt tolerating po and hydrated. Zofran, ppi. Discussed ED return precautions. diarrhea after eating fish at a restaurant. VSS, pt tolerating po and hydrated. Zofran, ppi. Discussed ED return precautions. To FU with pcp clinic.

## 2023-02-27 ENCOUNTER — EMERGENCY (EMERGENCY)
Facility: HOSPITAL | Age: 33
LOS: 1 days | Discharge: HOME | End: 2023-02-27
Attending: EMERGENCY MEDICINE
Payer: MEDICAID

## 2023-02-27 VITALS
SYSTOLIC BLOOD PRESSURE: 129 MMHG | HEIGHT: 59 IN | OXYGEN SATURATION: 98 % | WEIGHT: 194.01 LBS | TEMPERATURE: 101 F | HEART RATE: 117 BPM | DIASTOLIC BLOOD PRESSURE: 64 MMHG | RESPIRATION RATE: 16 BRPM

## 2023-02-27 LAB
FLUAV AG NPH QL: SIGNIFICANT CHANGE UP
FLUBV AG NPH QL: SIGNIFICANT CHANGE UP
RSV RNA NPH QL NAA+NON-PROBE: SIGNIFICANT CHANGE UP
S PYO DNA THROAT QL NAA+PROBE: SIGNIFICANT CHANGE UP
SARS-COV-2 RNA SPEC QL NAA+PROBE: SIGNIFICANT CHANGE UP

## 2023-02-27 PROCEDURE — 71045 X-RAY EXAM CHEST 1 VIEW: CPT

## 2023-02-27 PROCEDURE — 87798 DETECT AGENT NOS DNA AMP: CPT

## 2023-02-27 PROCEDURE — 71045 X-RAY EXAM CHEST 1 VIEW: CPT | Mod: 26

## 2023-02-27 PROCEDURE — 99284 EMERGENCY DEPT VISIT MOD MDM: CPT

## 2023-02-27 PROCEDURE — 99283 EMERGENCY DEPT VISIT LOW MDM: CPT

## 2023-02-27 PROCEDURE — 87637 SARSCOV2&INF A&B&RSV AMP PRB: CPT

## 2023-02-27 PROCEDURE — 96372 THER/PROPH/DIAG INJ SC/IM: CPT

## 2023-02-27 PROCEDURE — 87651 STREP A DNA AMP PROBE: CPT

## 2023-02-27 RX ORDER — KETOROLAC TROMETHAMINE 30 MG/ML
30 SYRINGE (ML) INJECTION ONCE
Refills: 0 | Status: DISCONTINUED | OUTPATIENT
Start: 2023-02-27 | End: 2023-02-27

## 2023-02-27 RX ADMIN — Medication 30 MILLIGRAM(S): at 02:35

## 2023-02-27 NOTE — ED ADULT NURSE NOTE - OBJECTIVE STATEMENT
Assumed care of pt at this time, A&Ox4, resp even/unlabored, ambulatory, steady gait noted, chief complaint sore throat x3 days. Pt reports self medicating at home with Excedrin and Theraflu, reports no relief, and worsening pain. Pt following commands, speaking in full sentences, no acute distress noted, airway patent, free of obstruction. Male friend at bed side, Dr. Meier at  bed side. Pt denies n/v, known sick contact, reports being vaccinated with Covid-19 vaccine

## 2023-02-27 NOTE — ED PROVIDER NOTE - SKIN NEGATIVE STATEMENT, MLM
PT DAILY TREATMENT NOTE 2-15    Patient Name: Risa Vizcarra  Date:2019  : 1957  [x]  Patient  Verified  Payor: Ricardo Su / Plan: Sarah Horner / Product Type: CAROLA /    In time:2:00p Out time:3:00p  Total Treatment Time (min): 60  Visit #:  4    Treatment Area: Shoulder stiffness, right [M25.611]  Stiffness of left shoulder, not elsewhere classified [M25.612]    SUBJECTIVE  Pain Level (0-10 scale): 1/10, 4-5 fatigue  Any medication changes, allergies to medications, adverse drug reactions, diagnosis change, or new procedure performed?: [x] No    [] Yes (see summary sheet for update)  Subjective functional status/changes:   [] No changes reported  Patient reports she is able to reach overhead with light objects without pain and minor stretching. OBJECTIVE    40 min Therapeutic Exercise:  [x] See flow sheet :   Rationale: increase ROM, increase strength and increase proprioception to improve the patients ability to lift, carry, reach, and complete ADL's    20 Min Manual Therapy:  PROM all directions to tolerance, STM kareem R breast, axilla, lateral torso, pec release   Rationale: decrease pain, increase ROM, increase tissue extensibility and decrease trigger points  to improve the patients ability to lift, carry, reach, and complete ADL's      With   [] TE   [] TA   [] neuro   [] other: Patient Education: [x] Review HEP    [] Progressed/Changed HEP based on:   [] positioning   [] body mechanics   [] transfers   [] heat/ice application    [] other:      Other Objective/Functional Measures:      Pain Level (0-10 scale) post treatment: 1    ASSESSMENT/Changes in Function:   Patient did well with advanced interventions with ROM WNL noted for IR/ER, and continues to be limited with flexion and abduction. Patient presents with 1cm diameter growth in L axilla, pink in color, no warmth, or signs of infection. Minor TTP, firm to touch.  Pt instructed to and if worsens to call MD to get an appointment sooner with surgeon. Patient next scheduled appointment with surgeon in 12/26. Patient will continue to benefit from skilled PT services to modify and progress therapeutic interventions, address functional mobility deficits, address ROM deficits, address strength deficits, analyze and address soft tissue restrictions, analyze and cue movement patterns, analyze and modify body mechanics/ergonomics and assess and modify postural abnormalities to attain remaining goals. []  See Plan of Care  []  See progress note/recertification  []  See Discharge Summary         Progress towards goals / Updated goals: Patient demonstrates improved overall ROM with tightness at end range, no increased pain.      PLAN  [x]  Upgrade activities as tolerated     [x]  Continue plan of care  []  Update interventions per flow sheet       []  Discharge due to:_  []  Other:_      Lori Streeter 12/17/2019 no abrasions, no jaundice, no lesions, no pruritis, and no rashes.

## 2023-02-27 NOTE — ED PROVIDER NOTE - PATIENT PORTAL LINK FT
You can access the FollowMyHealth Patient Portal offered by [facility] by registering at the following website: [email or web address]. By joining [employer]’s FollowMyHealth portal, you will also be able to view your health information using other applications (apps) compatible with our system.

## 2023-02-27 NOTE — ED PROVIDER NOTE - CLINICAL SUMMARY MEDICAL DECISION MAKING FREE TEXT BOX
Pt is well appearing, lungs clear on exam, no increased resp effort, no hypoxia. Sx consistent with viral flu-like illness. Advised self quarantine, and gave strict return precautions

## 2023-02-27 NOTE — ED ADULT TRIAGE NOTE - CHIEF COMPLAINT QUOTE
Ambulatory reporting 3 days of fever and sore throat. Has been taking Theraflu (@1800) and Excedrin (@1300) without relief. Vaccinated. Denies sick contacts.

## 2023-02-27 NOTE — ED ADULT TRIAGE NOTE - MODE OF ARRIVAL
Care Transitions Note:    CTS received referral for discharge planning needs.  Per EMR, pt lives with Jae quiroga, in [place].  Pt with PMH for dementia & COPD.    Pt is Covid positive & will need PT/OT recommendations to assist with disposition.      CTS to follow pt for discharge planning & complete full assessment with Jae quiroga.    JESUS Henning  [facility] [phone]   [facility]  [phone]     [facility] [employer] [facility] [employer]

## 2023-02-27 NOTE — ED PROVIDER NOTE - OBJECTIVE STATEMENT
Group Psychotherapy (PhD/LCSW)    Site: [facility]    Clinical status of patient: Intensive Outpatient Program (IOP)    Date: 7/16/2019    Group Focus: ACT Group Psychotherapy    Length of service: 80295 - 45-50 minutes    Number of patients in attendance: 15    Referred by: Addictive Behavior Unit Treatment Team    Target symptoms: Substance Abuse    Patient's response to treatment: Active Listening; Self-Disclosure    Progress toward goals: Progressing adequately    Interval History: Session focus was Values.  Patients were introduced to values and provided with the values table to complete.  Each patient discussed a value that is most important to focus on at this time.    Diagnosis: Synthetic cannabinoid abuse    Plan: Continue treatment on ABU         32 year old female presents with 4 days of cough, body aches, fevers, nausea, sore throat. No chest pain, SOB, abd pain, neck stiffness.

## 2023-02-28 ENCOUNTER — EMERGENCY (EMERGENCY)
Facility: HOSPITAL | Age: 33
LOS: 1 days | Discharge: HOME | End: 2023-02-28
Attending: EMERGENCY MEDICINE
Payer: MEDICAID

## 2023-02-28 VITALS
TEMPERATURE: 98 F | RESPIRATION RATE: 18 BRPM | OXYGEN SATURATION: 98 % | SYSTOLIC BLOOD PRESSURE: 104 MMHG | HEART RATE: 76 BPM | DIASTOLIC BLOOD PRESSURE: 69 MMHG

## 2023-02-28 VITALS
HEART RATE: 114 BPM | RESPIRATION RATE: 20 BRPM | TEMPERATURE: 100 F | SYSTOLIC BLOOD PRESSURE: 103 MMHG | OXYGEN SATURATION: 100 % | HEIGHT: 59 IN | DIASTOLIC BLOOD PRESSURE: 69 MMHG | WEIGHT: 197.98 LBS

## 2023-02-28 LAB
HADV DNA SPEC QL NAA+PROBE: DETECTED
HCG UR QL: NEGATIVE — SIGNIFICANT CHANGE UP
RAPID RVP RESULT: DETECTED
SARS-COV-2 RNA SPEC QL NAA+PROBE: SIGNIFICANT CHANGE UP

## 2023-02-28 PROCEDURE — 0225U NFCT DS DNA&RNA 21 SARSCOV2: CPT

## 2023-02-28 PROCEDURE — 99284 EMERGENCY DEPT VISIT MOD MDM: CPT

## 2023-02-28 PROCEDURE — 71046 X-RAY EXAM CHEST 2 VIEWS: CPT

## 2023-02-28 PROCEDURE — 81025 URINE PREGNANCY TEST: CPT

## 2023-02-28 PROCEDURE — 99284 EMERGENCY DEPT VISIT MOD MDM: CPT | Mod: 25

## 2023-02-28 PROCEDURE — 71046 X-RAY EXAM CHEST 2 VIEWS: CPT | Mod: 26

## 2023-02-28 RX ORDER — IBUPROFEN 200 MG
600 TABLET ORAL ONCE
Refills: 0 | Status: COMPLETED | OUTPATIENT
Start: 2023-02-28 | End: 2023-02-28

## 2023-02-28 RX ORDER — BENZOCAINE AND MENTHOL 5; 1 G/100ML; G/100ML
1 LIQUID ORAL ONCE
Refills: 0 | Status: COMPLETED | OUTPATIENT
Start: 2023-02-28 | End: 2023-02-28

## 2023-02-28 RX ADMIN — Medication 100 MILLIGRAM(S): at 13:57

## 2023-02-28 RX ADMIN — Medication 600 MILLIGRAM(S): at 12:13

## 2023-02-28 RX ADMIN — BENZOCAINE AND MENTHOL 1 LOZENGE: 5; 1 LIQUID ORAL at 12:13

## 2023-02-28 NOTE — ED ADULT TRIAGE NOTE - CHIEF COMPLAINT QUOTE
reports sore throat, five days of fever, seen at [facility] and discharged reports sore throat, five days of fever, seen at [facility] and discharged reports sore throat, five days of fever, seen at [facility] and discharged

## 2023-02-28 NOTE — ED PROVIDER NOTE - ATTENDING CONTRIBUTION TO CARE
reviewed pt's prior ED visit to [facility], CXR showing retrocardiac infiltrate.    Hx: 5d of cough.    PE: crackels L base.  No respiratory distress.  MDM: likely pneumonia in healthy adult, consider atypical bacteria coverage, start antibx, good outpt f/u, considered labs and repeat cxr but pt is healthy, vitals appropriate, no red flags for dehydration or anemia. reviewed pt's prior ED visit to [facility], CXR showing retrocardiac infiltrate.    Hx: 5d of cough.    PE: crackels L base.  No respiratory distress.  MDM: likely pneumonia in healthy adult, consider atypical bacteria coverage, start antibx, good outpt f/u, considered labs and repeat cxr but pt is healthy, vitals appropriate, no red flags for dehydration or anemia. reviewed pt's prior ED visit to [facility], CXR showing retrocardiac infiltrate.    Hx: 5d of cough.    PE: crackels L base.  No respiratory distress.  MDM: likely pneumonia in healthy adult, consider atypical bacteria coverage, start antibx, good outpt f/u, considered labs and repeat cxr but pt is healthy, vitals appropriate, no red flags for dehydration or anemia.

## 2023-02-28 NOTE — ED ADULT NURSE NOTE - CHPI ED NUR HIGHEST TEMPERATURE
[facility]

## 2023-02-28 NOTE — ED PROVIDER NOTE - PATIENT PORTAL LINK FT
You can access the FollowMyHealth Patient Portal offered by [facility] by registering at the following website: [email or web address]. By joining [employer]’s FollowMyHealth portal, you will also be able to view your health information using other applications (apps) compatible with our system. You can access the FollowMyHealth Patient Portal offered by [facility] by registering at the following website: [email or web address]. By joining [employer]’s FollowMyHealth portal, you will also be able to view your health information using other applications (apps) compatible with our system. You can access the FollowMyHealth Patient Portal offered by [facility] by registering at the following website: [email or web address]. By joining [employer]’s FollowMyHealth portal, you will also be able to view your health information using other applications (apps) compatible with our system.

## 2023-02-28 NOTE — ED PROVIDER NOTE - PHYSICAL EXAMINATION
General:  NAD  HEENT: NCAT, PERRL  Cardiac:  RRR, no murmurs, 2+ peripheral pulses  Chest: Left lower lobe crackles.  Abdomen: soft, non-distended, bowel sounds present, no ttp, no rebound or guarding  Extremities: no peripheral edema, calf tenderness, or leg size discrepancies  Skin:  no rashes  Neuro: AAOx4, 5+motor, sensory grossly intact  Psych: mood and affect appropriate

## 2023-02-28 NOTE — ED PROVIDER NOTE - OBJECTIVE STATEMENT
32-year-old female no significant pmhx comes to ED w/ cough, sore throat, fever for 5 days.  Patient denies any sick contacts at home.  Patient reports that symptoms started randomly. Their pain/symptom is moderate, constant, non mediating with rest. Patient reports nausea and diarrhea in addition to other symptoms.  Denies falls, trauma, chest pain, difficulty breathing, abdominal pain, vomiting, urinary symptoms, hematochezia, melena, skin changes.

## 2023-02-28 NOTE — ED ADULT NURSE NOTE - NSIMPLEMENTINTERV_GEN_ALL_ED
Implemented All Universal Safety Interventions:  [place] to call system. Call bell, personal items and telephone within reach. Instruct patient to call for assistance. Room bathroom lighting operational. Non-slip footwear when patient is off stretcher. Physically safe environment: no spills, clutter or unnecessary equipment. Stretcher in lowest position, wheels locked, appropriate side rails in place. Implemented All Universal Safety Interventions:  [place] to call system. Call bell, personal items and telephone within reach. Instruct patient to call for assistance. Room bathroom lighting operational. Non-slip footwear when patient is off stretcher. Physically safe environment: no spills, clutter or unnecessary equipment. Stretcher in lowest position, wheels locked, appropriate side rails in place. Implemented All Universal Safety Interventions:  [place] to call system. Call bell, personal items and telephone within reach. Instruct patient to call for assistance. Room bathroom lighting operational. Non-slip footwear when patient is off stretcher. Physically safe environment: no spills, clutter or unnecessary equipment. Stretcher in lowest position, wheels locked, appropriate side rails in place.

## 2023-02-28 NOTE — ED PROVIDER NOTE - PROGRESS NOTE DETAILS
Patient's prescription sent to their pharmacy indicated during interview. Improvement on symptoms. Spoke to patient/family about results. Plan to discharge patient. Patient given PCP follow up and return precautions. Patient/family agrees with plan.

## 2023-02-28 NOTE — ED ADULT NURSE NOTE - OBJECTIVE STATEMENT
33 y/o F with no significant PMHx presents to the ED with complaints of fever x 4 days. Patient reports fever (Tmax 103), for which she has taken Motrin and Tylenol interchangeably, most recently 1800 yesterday with improvement of symptoms. Notes associated throat pain 8/10 in severity, lethargy, decreased appetite, and abdominal cramping. Denies headache, dizziness, nausea. Denies recent sick contacts. AOx4 and speaking coherently. Breathing is unlabored, spontaneous, and symmetrical. +S1S2. Abdomen and bladder are nondistended. No peripheral edema. <2s capillary refill. Ambulatory with full ROM of all extremities. 31 y/o F with no significant PMHx presents to the ED with complaints of fever x 4 days. Patient reports fever (Tmax 103), for which she has taken Motrin and Tylenol interchangeably, most recently 1800 yesterday with improvement of symptoms. Notes associated throat pain 8/10 in severity, lethargy, decreased appetite, and abdominal cramping. Denies headache, dizziness, nausea. Denies recent sick contacts. AOx4 and speaking coherently. Breathing is unlabored, spontaneous, and symmetrical. +S1S2. Abdomen and bladder are nondistended. No peripheral edema. <2s capillary refill. Ambulatory with full ROM of all extremities.

## 2023-02-28 NOTE — ED ADULT NURSE NOTE - CHIEF COMPLAINT QUOTE
reports sore throat, five days of fever, seen at [facility] and discharged reports sore throat, five days of fever, seen at [facility] and discharged reports sore throat, five days of fever, seen at [facility] and discharged

## 2023-02-28 NOTE — ED PROVIDER NOTE - RAPID ASSESSMENT
pt briefly evaluated in triage as rapid assessment  protocol with limited history, physical exam and assessment performed -- care to be undertaken by the main ED team    pt c/op 5 days of uri symptoms, unproductive cough, fevers, no underlying lung disease, evaluated at another hospital 5 days ago and "called back" reguarding her CXR (had "negative" COVID, Flu, Strep testing at that time).  no new symptoms/complaints     - Vignesh Dillon MD

## 2023-02-28 NOTE — ED PROVIDER NOTE - CLINICAL SUMMARY MEDICAL DECISION MAKING FREE TEXT BOX
Impression: 32-year-old female no significant pmhx comes to ED w/ cough, sore throat, fever for 5 days.  Their symptoms and exam findings of left lower lobe crackles are concerning for pneumonia, viral illness.

## 2023-02-28 NOTE — ED PROVIDER NOTE - NSFOLLOWUPINSTRUCTIONS_ED_ALL_ED_FT
Viral Respiratory Infection    A viral respiratory infection is an illness that affects parts of the body used for breathing, like the lungs, nose, and throat. It is caused by a germ called a virus. Symptoms can include runny nose, coughing, sneezing, fatigue, body aches, sore throat, fever, or headache. Over the counter medicine can be used to manage the symptoms but the infection typically goes away on its own in 5 to 10 days.       Follow-up primary care doctor in 1 week.    SEEK IMMEDIATE MEDICAL CARE IF YOU HAVE ANY OF THE FOLLOWING SYMPTOMS: shortness of breath, chest pain, fever over 10 days, or lightheadedness/dizziness. Pneumonia    Pneumonia is an infection of the lungs. Pneumonia may be caused by bacteria, viruses, or funguses. Symptoms include coughing, fever, chest pain when breathing deeply or coughing, shortness of breath, fatigue, or muscle aches. Pneumonia can be diagnosed with a medical history and physical exam, as well as other tests which may include a chest X-ray. If you were prescribed an antibiotic medicine, take it as told by your health care provider and do not stop taking the antibiotic even if you start to feel better. Do not use tobacco products, including cigarettes, chewing tobacco, and e-cigarettes.    Follow-up primary care doctor in 1 week.    SEEK IMMEDIATE MEDICAL CARE IF YOU HAVE ANY OF THE FOLLOWING SYMPTOMS: worsening shortness of breath, worsening chest pain, coughing up blood, change in mental status, lightheadedness/dizziness.

## 2023-07-28 NOTE — ED ADULT NURSE NOTE - NS ED NURSE LEVEL OF CONSCIOUSNESS ORIENTATION
Oriented - self; Oriented - place; Oriented - time
PAST MEDICAL HISTORY:  Asthma     HTN (hypertension)
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
## 2024-12-18 ENCOUNTER — EMERGENCY (EMERGENCY)
Facility: HOSPITAL | Age: 34
LOS: 1 days | Discharge: HOME | End: 2024-12-18
Attending: STUDENT IN AN ORGANIZED HEALTH CARE EDUCATION/TRAINING PROGRAM
Payer: COMMERCIAL

## 2024-12-18 VITALS
OXYGEN SATURATION: 95 % | HEIGHT: 65 IN | RESPIRATION RATE: 20 BRPM | TEMPERATURE: 98 F | WEIGHT: 167.99 LBS | SYSTOLIC BLOOD PRESSURE: 117 MMHG | HEART RATE: 78 BPM | DIASTOLIC BLOOD PRESSURE: 73 MMHG

## 2024-12-18 PROCEDURE — 99283 EMERGENCY DEPT VISIT LOW MDM: CPT

## 2024-12-18 PROCEDURE — 73030 X-RAY EXAM OF SHOULDER: CPT

## 2024-12-18 PROCEDURE — 99284 EMERGENCY DEPT VISIT MOD MDM: CPT

## 2024-12-18 PROCEDURE — 73030 X-RAY EXAM OF SHOULDER: CPT | Mod: 26,RT

## 2024-12-18 RX ORDER — METHOCARBAMOL 500 MG/1
1 TABLET, FILM COATED ORAL
Qty: 15 | Refills: 0
Start: 2024-12-18 | End: 2024-12-22

## 2024-12-18 RX ORDER — ACETAMINOPHEN 500MG 500 MG/1
650 TABLET, COATED ORAL ONCE
Refills: 0 | Status: COMPLETED | OUTPATIENT
Start: 2024-12-18 | End: 2024-12-18

## 2024-12-18 RX ADMIN — ACETAMINOPHEN 500MG 650 MILLIGRAM(S): 500 TABLET, COATED ORAL at 20:25

## 2024-12-18 NOTE — ED PROVIDER NOTE - CARE PROVIDER_API CALL
Lavon Fernandez  Orthopaedic Trauma  [address] [place] [address]  Phone: [phone]  Fax: [phone]  Follow Up Time:

## 2024-12-18 NOTE — ED PROVIDER NOTE - ATTENDING APP SHARED VISIT CONTRIBUTION OF CARE
52 y/o female with pmhx cholecystectomy, herniated discs, gastric bypass (14 yrs ago), hypothyroidism presents to the ED for RLQ pain x 3 weeks intermittently. +Diarrhea. +diffuse back pain- consistent with chronic back pain. No dysuria, no hematuria, no nausea/vomiting, no vaginal bleeding/ discharge. no SOB/CP, no cough/congestion.    CONSTITUTIONAL: In no apparent distress.   MUSCULOSKELETAL: Spine appears normal, movement of extremities grossly intact.  NEUROLOGICAL: Alert and interactive, no focal deficits, tone is normal, moving all extremities well,    IAdelia, evaluated the patient with the PA, and completed the key components of the history and physical exam. I then discussed the management plan with the PA. 33yo female with no pmhx presents to ED c/o R shoulder pain x 1.5 weeks. Pt states that 1.5 weeks ago she had a lymphatic drainage massage and since then has had R shoulder pain. Pt states that she is a  and is constantly using her R arm/shoulder. Pt states that she is able to fully range the shoulder but it hurts when she lifts the arm up. Pt states she has tried tylenol, motrin, and naproxen at home which she states helped a little. Denies any recent trauma to the shoulder. Pt denies any chest pain, SOB, N/V/D, parasthesias. Pt denies any other complaints at this time.    CONSTITUTIONAL: In no apparent distress.   MUSCULOSKELETAL: Spine appears normal, movement of extremities grossly intact.  NEUROLOGICAL: Alert and interactive, no focal deficits, tone is normal, moving all extremities well,    IAdelia, evaluated the patient with the PA, and completed the key components of the history and physical exam. I then discussed the management plan with the PA.

## 2024-12-18 NOTE — ED PROVIDER NOTE - CARE PROVIDERS DIRECT ADDRESSES
[email or web address]

## 2024-12-18 NOTE — ED ADULT TRIAGE NOTE - CHIEF COMPLAINT QUOTE
pt arrives to ED c/o right shoulder pain after receiving a massage, denies N/V/D/CP/SOB, taking Naproxen for pain

## 2024-12-18 NOTE — ED PROVIDER NOTE - PHYSICAL EXAMINATION
Gen: No acute distress, non toxic  HENT: NCAT, Mucous membranes moist, Oropharynx without exudates, uvula midline  Eyes: pink conjunctivae, EOMI, PERRL  CV: RRR, nl s1/s2.  Resp: CTAB, normal rate and effort  GI: Abdomen soft, NT, ND. No rebound, no guarding  Neuro: A&O x 3, sensorimotor intact without deficits   MSK: No spine or joint tenderness to palpation, Full ROM ext x 4, distal pulses intact  Skin: No rashes. intact and perfused

## 2024-12-18 NOTE — ED PROVIDER NOTE - OBJECTIVE STATEMENT
33yo female with no pmhx presents to ED c/o R shoulder pain x 1.5 weeks. Pt states that 1.5 weeks ago she had a lymphatic drainage massage and since then has had R shoulder pain. Pt states that she is a  and is constantly using her R arm/shoulder. Pt states that she is able to fully range the shoulder but it hurts when she lifts the arm up. Pt states she has tried tylenol, motrin, and naproxen at home which she states helped a little. Denies any recent trauma to the shoulder. Pt denies any chest pain, SOB, N/V/D, parasthesias. Pt denies any other complaints at this time.

## 2024-12-18 NOTE — ED PROVIDER NOTE - NSFOLLOWUPINSTRUCTIONS_ED_ALL_ED_FT
Please follow up with orthopedic specialist provided within 3 days.    Please follow up with your PCP within 3 days.     Strain    A strain is a stretch or tear in one of the muscles in your body. This is caused by an injury to the area such as a twisting mechanism. Symptoms include pain, swelling, or bruising. Rest that area over the next several days and slowly resume activity when tolerated. Ice can help with swelling and pain.     SEEK IMMEDIATE MEDICAL CARE IF YOU HAVE ANY OF THE FOLLOWING SYMPTOMS: worsening pain, inability to move that body part, numbness or tingling.

## 2024-12-18 NOTE — ED PROVIDER NOTE - PATIENT PORTAL LINK FT
You can access the FollowMyHealth Patient Portal offered by [facility] by registering at the following website: [email or web address]. By joining [employer]’s FollowMyHealth portal, you will also be able to view your health information using other applications (apps) compatible with our system.

## 2024-12-18 NOTE — ED ADULT NURSE NOTE - OBJECTIVE STATEMENT
Pt presents to ED c/o R shoulder pain s/p lymphatic drainage massage. Pt reports also working as a  and constantly doing lifting. Pt denies any CP, SOB, HA, dizziness, vision changes, abd pain, NVD, fever/chills, cough, and urinary symptoms at this time. Resp even and unlabored. NAD present. Pt pending XR. Pt made aware of plan of care and verbalized understanding.

## 2024-12-18 NOTE — ED ADULT TRIAGE NOTE - BIRTH SEX
"        REASON FOR FOLLOWUP/CHIEF COMPLAINT: Vaginal irritation     HISTORY OF PRESENT ILLNESS: Patient has noted an area that appears to be at the vaginal introitus and possibly midline anterior that has been irritating to her.  She has not had any change in vaginal discharge and has not had any bleeding as a result of this area.  She thought she had a urinary tract infection and was seen at an urgent care center and a culture just showed mixed shana and she took a course of antibiotic because of that.  She had one episode where she thought the area of the irritation was exacerbated with intercourse.  This has not been a persistent thing with intercourse however.  Her last exam with Dr. Dhillon here in the office was normal on 930 of 2020.      Past Medical History, Past Surgical History, Social History, Family History have been reviewed and are without significant changes except as mentioned.    Review of Systems   Review of Systems   Constitutional: Negative for fatigue and fever.   Respiratory: Negative for cough and shortness of breath.    Genitourinary: Positive for vaginal pain. Negative for vaginal bleeding.       Medications:  The current medication list was reviewed in the EMR    ALLERGIES:    Allergies   Allergen Reactions   • Dronabinol    • Megestrol    • Metoclopramide    • Sulfa Antibiotics Nausea And Vomiting     Other reaction(s): Nausea And Vomiting              Vitals:    09/17/21 1016   BP: 138/78   Weight: 89.4 kg (197 lb 3.2 oz)   Height: 170.2 cm (67.01\")     Physical Exam    CONSTITUTIONAL:  Vital signs reviewed.  No distress, looks comfortable.    GASTROINTESTINAL: Abdomen appears unremarkable.  Nontender.  No hepatomegaly.  No splenomegaly.    PELVIC: Normal external genitalia and vaginal introitus.  At the fold between the urethral meatus and the labia minor on the patient's left, there is a very small linear abrasion and slight defect in the tissue.  This area is tender on Q-tip " inspection and is the area that the patient notes has been irritated the last 3 weeks.  Silver nitrate is applied.  .  PSYCHIATRIC:  Normal judgment and insight.  Normal mood and affect.       RECENT LABS:No results found for: WBC, HGB, PLT    ASSESSMENT/PLAN:  Johanne NAPOL[facility] [place] Room/bed info not found   Vaginal abrasion.  Silver nitrate applied and I recommended pelvic rest for 2 weeks to allow it to heal.  If it still present in 2 weeks we will reapply a reassess.  There is definitely nothing worrisome or anything to biopsy.   Female

## 2024-12-18 NOTE — ED PROVIDER NOTE - CLINICAL SUMMARY MEDICAL DECISION MAKING FREE TEXT BOX
33yo female with no pmhx presents to ED c/o R shoulder pain x 1.5 weeks. Pt states that 1.5 weeks ago she had a lymphatic drainage massage and since then has had R shoulder pain. XR reviewed. No fx or dislocation. Likely muscular strain/spasm. Pt states she did not want robaxin here in ED as she has to drive today but wanted it sent to pharmacy. Robaxin sent to pharmacy. Given ortho follow up if needed. Pt agrees and understands plan for discharge. Return precautions discussed with patient.

## 2024-12-18 NOTE — ED PROVIDER NOTE - NS ED ROS FT
Gen: denies fever, chills, fatigue, weight loss  Skin: denies rashes, laceration, bruising  HEENT: denies visual changes, ear pain, nasal congestion, throat pain  Respiratory: denies MEDEIROS, SOB, cough, wheezing  Cardiovascular: denies chest pain, palpitations, diaphoresis, LE edema  GI: denies abdominal pain, n/v/d  : denies dysuria, frequency, urgency, bowel/bladder incontinence  MSK: +R shoulder pain  Neuro: denies headache, dizziness, weakness, numbness

## 2025-02-02 ENCOUNTER — EMERGENCY (EMERGENCY)
Facility: HOSPITAL | Age: 35
LOS: 1 days | Discharge: HOME | End: 2025-02-02
Attending: STUDENT IN AN ORGANIZED HEALTH CARE EDUCATION/TRAINING PROGRAM
Payer: COMMERCIAL

## 2025-02-02 VITALS
OXYGEN SATURATION: 100 % | HEIGHT: 65 IN | WEIGHT: 177.25 LBS | TEMPERATURE: 98 F | HEART RATE: 109 BPM | SYSTOLIC BLOOD PRESSURE: 109 MMHG | RESPIRATION RATE: 18 BRPM | DIASTOLIC BLOOD PRESSURE: 73 MMHG

## 2025-02-02 LAB
ALBUMIN SERPL ELPH-MCNC: 4 G/DL — SIGNIFICANT CHANGE UP (ref 3.3–5.2)
ALP SERPL-CCNC: 58 U/L — SIGNIFICANT CHANGE UP (ref 40–120)
ALT FLD-CCNC: 12 U/L — SIGNIFICANT CHANGE UP
ANION GAP SERPL CALC-SCNC: 13 MMOL/L — SIGNIFICANT CHANGE UP (ref 5–17)
APPEARANCE UR: CLEAR — SIGNIFICANT CHANGE UP
AST SERPL-CCNC: 19 U/L — SIGNIFICANT CHANGE UP
BASOPHILS # BLD AUTO: 0.01 K/UL — SIGNIFICANT CHANGE UP (ref 0–0.2)
BASOPHILS NFR BLD AUTO: 0.1 % — SIGNIFICANT CHANGE UP (ref 0–2)
BILIRUB SERPL-MCNC: 0.4 MG/DL — SIGNIFICANT CHANGE UP (ref 0.4–2)
BILIRUB UR-MCNC: NEGATIVE — SIGNIFICANT CHANGE UP
BUN SERPL-MCNC: 8.6 MG/DL — SIGNIFICANT CHANGE UP (ref 8–20)
CALCIUM SERPL-MCNC: 8.7 MG/DL — SIGNIFICANT CHANGE UP (ref 8.4–10.5)
CHLORIDE SERPL-SCNC: 102 MMOL/L — SIGNIFICANT CHANGE UP (ref 96–108)
CO2 SERPL-SCNC: 22 MMOL/L — SIGNIFICANT CHANGE UP (ref 22–29)
COLOR SPEC: YELLOW — SIGNIFICANT CHANGE UP
CREAT SERPL-MCNC: 0.77 MG/DL — SIGNIFICANT CHANGE UP (ref 0.5–1.3)
DIFF PNL FLD: NEGATIVE — SIGNIFICANT CHANGE UP
EGFR: 102 ML/MIN/1.73M2 — SIGNIFICANT CHANGE UP
EGFR: 102 ML/MIN/1.73M2 — SIGNIFICANT CHANGE UP
EOSINOPHIL # BLD AUTO: 0 K/UL — SIGNIFICANT CHANGE UP (ref 0–0.5)
EOSINOPHIL NFR BLD AUTO: 0 % — SIGNIFICANT CHANGE UP (ref 0–6)
FLUAV AG NPH QL: SIGNIFICANT CHANGE UP
FLUBV AG NPH QL: SIGNIFICANT CHANGE UP
GLUCOSE SERPL-MCNC: 93 MG/DL — SIGNIFICANT CHANGE UP (ref 70–99)
GLUCOSE UR QL: NEGATIVE MG/DL — SIGNIFICANT CHANGE UP
HCG SERPL-ACNC: <4 MIU/ML — SIGNIFICANT CHANGE UP
HCT VFR BLD CALC: 41.1 % — SIGNIFICANT CHANGE UP (ref 34.5–45)
HGB BLD-MCNC: 13.3 G/DL — SIGNIFICANT CHANGE UP (ref 11.5–15.5)
IMM GRANULOCYTES NFR BLD AUTO: 0.4 % — SIGNIFICANT CHANGE UP (ref 0–0.9)
KETONES UR-MCNC: 15 MG/DL
LEUKOCYTE ESTERASE UR-ACNC: NEGATIVE — SIGNIFICANT CHANGE UP
LIDOCAIN IGE QN: 15 U/L — LOW (ref 22–51)
LYMPHOCYTES # BLD AUTO: 0.55 K/UL — LOW (ref 1–3.3)
LYMPHOCYTES # BLD AUTO: 7.3 % — LOW (ref 13–44)
MCHC RBC-ENTMCNC: 29.8 PG — SIGNIFICANT CHANGE UP (ref 27–34)
MCHC RBC-ENTMCNC: 32.4 G/DL — SIGNIFICANT CHANGE UP (ref 32–36)
MCV RBC AUTO: 91.9 FL — SIGNIFICANT CHANGE UP (ref 80–100)
MONOCYTES # BLD AUTO: 0.49 K/UL — SIGNIFICANT CHANGE UP (ref 0–0.9)
MONOCYTES NFR BLD AUTO: 6.5 % — SIGNIFICANT CHANGE UP (ref 2–14)
NEUTROPHILS # BLD AUTO: 6.44 K/UL — SIGNIFICANT CHANGE UP (ref 1.8–7.4)
NEUTROPHILS NFR BLD AUTO: 85.7 % — HIGH (ref 43–77)
NITRITE UR-MCNC: NEGATIVE — SIGNIFICANT CHANGE UP
PH UR: 5.5 — SIGNIFICANT CHANGE UP (ref 5–8)
PLATELET # BLD AUTO: 330 K/UL — SIGNIFICANT CHANGE UP (ref 150–400)
POTASSIUM SERPL-MCNC: 4.1 MMOL/L — SIGNIFICANT CHANGE UP (ref 3.5–5.3)
POTASSIUM SERPL-SCNC: 4.1 MMOL/L — SIGNIFICANT CHANGE UP (ref 3.5–5.3)
PROT SERPL-MCNC: 7.6 G/DL — SIGNIFICANT CHANGE UP (ref 6.6–8.7)
PROT UR-MCNC: SIGNIFICANT CHANGE UP MG/DL
RBC # BLD: 4.47 M/UL — SIGNIFICANT CHANGE UP (ref 3.8–5.2)
RBC # FLD: 12.8 % — SIGNIFICANT CHANGE UP (ref 10.3–14.5)
RSV RNA NPH QL NAA+NON-PROBE: SIGNIFICANT CHANGE UP
SARS-COV-2 RNA SPEC QL NAA+PROBE: SIGNIFICANT CHANGE UP
SODIUM SERPL-SCNC: 137 MMOL/L — SIGNIFICANT CHANGE UP (ref 135–145)
SP GR SPEC: >1.03 — HIGH (ref 1–1.03)
UROBILINOGEN FLD QL: 1 MG/DL — SIGNIFICANT CHANGE UP (ref 0.2–1)
WBC # BLD: 7.52 K/UL — SIGNIFICANT CHANGE UP (ref 3.8–10.5)
WBC # FLD AUTO: 7.52 K/UL — SIGNIFICANT CHANGE UP (ref 3.8–10.5)

## 2025-02-02 PROCEDURE — 85025 COMPLETE CBC W/AUTO DIFF WBC: CPT

## 2025-02-02 PROCEDURE — 80053 COMPREHEN METABOLIC PANEL: CPT

## 2025-02-02 PROCEDURE — 96375 TX/PRO/DX INJ NEW DRUG ADDON: CPT

## 2025-02-02 PROCEDURE — 87637 SARSCOV2&INF A&B&RSV AMP PRB: CPT

## 2025-02-02 PROCEDURE — 96374 THER/PROPH/DIAG INJ IV PUSH: CPT

## 2025-02-02 PROCEDURE — 84702 CHORIONIC GONADOTROPIN TEST: CPT

## 2025-02-02 PROCEDURE — 99284 EMERGENCY DEPT VISIT MOD MDM: CPT

## 2025-02-02 PROCEDURE — 87086 URINE CULTURE/COLONY COUNT: CPT

## 2025-02-02 PROCEDURE — 99284 EMERGENCY DEPT VISIT MOD MDM: CPT | Mod: 25

## 2025-02-02 PROCEDURE — 36415 COLL VENOUS BLD VENIPUNCTURE: CPT

## 2025-02-02 PROCEDURE — 83690 ASSAY OF LIPASE: CPT

## 2025-02-02 PROCEDURE — 81003 URINALYSIS AUTO W/O SCOPE: CPT

## 2025-02-02 RX ORDER — ACETAMINOPHEN 500 MG/5ML
1000 LIQUID (ML) ORAL ONCE
Refills: 0 | Status: COMPLETED | OUTPATIENT
Start: 2025-02-02 | End: 2025-02-02

## 2025-02-02 RX ORDER — ONDANSETRON HCL/PF 4 MG/2 ML
4 VIAL (ML) INJECTION ONCE
Refills: 0 | Status: COMPLETED | OUTPATIENT
Start: 2025-02-02 | End: 2025-02-02

## 2025-02-02 RX ORDER — ONDANSETRON HCL/PF 4 MG/2 ML
1 VIAL (ML) INJECTION
Qty: 12 | Refills: 0
Start: 2025-02-02 | End: 2025-02-05

## 2025-02-02 RX ADMIN — Medication 400 MILLIGRAM(S): at 10:37

## 2025-02-02 RX ADMIN — Medication 20 MILLIGRAM(S): at 10:38

## 2025-02-02 RX ADMIN — Medication 4 MILLIGRAM(S): at 10:38

## 2025-02-02 RX ADMIN — Medication 1000 MILLILITER(S): at 10:37

## 2025-02-03 LAB
CULTURE RESULTS: SIGNIFICANT CHANGE UP
SPECIMEN SOURCE: SIGNIFICANT CHANGE UP
